# Patient Record
Sex: FEMALE | Race: WHITE | Employment: FULL TIME | URBAN - METROPOLITAN AREA
[De-identification: names, ages, dates, MRNs, and addresses within clinical notes are randomized per-mention and may not be internally consistent; named-entity substitution may affect disease eponyms.]

---

## 2022-04-25 ENCOUNTER — OFFICE VISIT (OUTPATIENT)
Dept: FAMILY MEDICINE CLINIC | Age: 62
End: 2022-04-25
Payer: MEDICARE

## 2022-04-25 VITALS
DIASTOLIC BLOOD PRESSURE: 70 MMHG | SYSTOLIC BLOOD PRESSURE: 126 MMHG | HEART RATE: 66 BPM | WEIGHT: 169 LBS | HEIGHT: 64 IN | OXYGEN SATURATION: 97 % | BODY MASS INDEX: 28.85 KG/M2 | TEMPERATURE: 97.7 F

## 2022-04-25 DIAGNOSIS — F17.210 CIGARETTE SMOKER: ICD-10-CM

## 2022-04-25 DIAGNOSIS — F41.8 MIXED ANXIETY AND DEPRESSIVE DISORDER: ICD-10-CM

## 2022-04-25 DIAGNOSIS — E78.2 MIXED HYPERLIPIDEMIA: ICD-10-CM

## 2022-04-25 DIAGNOSIS — R20.2 NUMBNESS AND TINGLING: Primary | ICD-10-CM

## 2022-04-25 DIAGNOSIS — Z11.59 SCREENING FOR VIRAL DISEASE: ICD-10-CM

## 2022-04-25 DIAGNOSIS — M15.9 PRIMARY OSTEOARTHRITIS INVOLVING MULTIPLE JOINTS: ICD-10-CM

## 2022-04-25 DIAGNOSIS — Z23 ENCOUNTER FOR IMMUNIZATION: ICD-10-CM

## 2022-04-25 DIAGNOSIS — R20.0 NUMBNESS AND TINGLING: Primary | ICD-10-CM

## 2022-04-25 DIAGNOSIS — Z12.11 COLON CANCER SCREENING: ICD-10-CM

## 2022-04-25 DIAGNOSIS — J44.9 CHRONIC OBSTRUCTIVE PULMONARY DISEASE, UNSPECIFIED COPD TYPE (HCC): ICD-10-CM

## 2022-04-25 DIAGNOSIS — F10.90 ALCOHOL INTAKE ABOVE RECOMMENDED SENSIBLE LIMITS: ICD-10-CM

## 2022-04-25 PROBLEM — Z86.59 HISTORY OF BIPOLAR DISORDER: Status: ACTIVE | Noted: 2022-04-25

## 2022-04-25 PROBLEM — M15.0 PRIMARY OSTEOARTHRITIS INVOLVING MULTIPLE JOINTS: Status: ACTIVE | Noted: 2022-04-25

## 2022-04-25 PROCEDURE — G8427 DOCREV CUR MEDS BY ELIG CLIN: HCPCS | Performed by: FAMILY MEDICINE

## 2022-04-25 PROCEDURE — G8510 SCR DEP NEG, NO PLAN REQD: HCPCS | Performed by: FAMILY MEDICINE

## 2022-04-25 PROCEDURE — 90677 PCV20 VACCINE IM: CPT | Performed by: FAMILY MEDICINE

## 2022-04-25 PROCEDURE — 99204 OFFICE O/P NEW MOD 45 MIN: CPT | Performed by: FAMILY MEDICINE

## 2022-04-25 PROCEDURE — 3017F COLORECTAL CA SCREEN DOC REV: CPT | Performed by: FAMILY MEDICINE

## 2022-04-25 PROCEDURE — 99406 BEHAV CHNG SMOKING 3-10 MIN: CPT | Performed by: FAMILY MEDICINE

## 2022-04-25 PROCEDURE — G8419 CALC BMI OUT NRM PARAM NOF/U: HCPCS | Performed by: FAMILY MEDICINE

## 2022-04-25 RX ORDER — ESCITALOPRAM OXALATE 20 MG/1
10 TABLET ORAL 2 TIMES DAILY
COMMUNITY
End: 2022-08-17 | Stop reason: SDUPTHER

## 2022-04-25 RX ORDER — ALBUTEROL SULFATE 90 UG/1
2 AEROSOL, METERED RESPIRATORY (INHALATION)
COMMUNITY
End: 2022-10-17

## 2022-04-25 RX ORDER — BUDESONIDE AND FORMOTEROL FUMARATE DIHYDRATE 160; 4.5 UG/1; UG/1
1 AEROSOL RESPIRATORY (INHALATION) DAILY
COMMUNITY

## 2022-04-25 RX ORDER — ACETAMINOPHEN, ASPIRIN (NSAID), AND CAFFEINE 250; 250; 65 MG/1; MG/1; MG/1
3 TABLET, FILM COATED ORAL DAILY
COMMUNITY

## 2022-04-25 NOTE — PROGRESS NOTES
Chief Complaint   Patient presents with    New Patient     Patient moced here about a year ago from [place] needs to get established     Other     patient having numbness and tingling in arms and hands sometimes feet x 6 months or more      1. Have you been to the ER, urgent care clinic since your last visit? Hospitalized since your last visit? Yes, patient was seen at [facility] in the summer x 2 weeks for pneumonia/copd      2. Have you seen or consulted any other health care providers outside of the [address] [place] [address] since your last visit? Include any pap smears or colon screening.  No       3 most recent PHQ Screens 4/25/2022   Little interest or pleasure in doing things Not at all   Feeling down, depressed, irritable, or hopeless Not at all   Total Score PHQ 2 0   Trouble falling or staying asleep, or sleeping too much Not at all   Feeling tired or having little energy Not at all   Poor appetite, weight loss, or overeating Several days   Feeling bad about yourself - or that you are a failure or have let yourself or your family down Not at all   Trouble concentrating on things such as school, work, reading, or watching TV Not at all   Moving or speaking so slowly that other people could have noticed; or the opposite being so fidgety that others notice Not at all   Thoughts of being better off dead, or hurting yourself in some way Not at all   PHQ 9 Score 1   How difficult have these problems made it for you to do your work, take care of your home and get along with others Not difficult at all     Patient has an appointment for Mammogram scheduled for 5/3/2022 and needs order for Colonoscopy

## 2022-04-25 NOTE — PROGRESS NOTES
Subjective  Chief Complaint   Patient presents with    New Patient     Patient moced here about a year ago from [place] needs to get established     Other     patient having numbness and tingling in arms and hands sometimes feet x 6 months or more      HPI:  Shonda Pride is a 63 y.o. female. Patient is new to the area. She moved here from [place] to live with her daughter after her   approximately 1 year ago. She has seen patient first and has been admitted to [facility] for COPD exacerbation. She is now following with pulmonology locally. She has a history of bipolar disorder but her daughter and patient both note that she has not had any manic issues and is doing very well since moving to this area. Moving here has decreased many of her stressors. She also has a history of increased alcohol use. Currently is drinking 3 servings of alcohol daily. Generally it is beer with an occasional shot of liquor. She also notes a history of degenerative joint disease. She is complaining of tingling from bilateral axillla that runs down to hands. Worse at night. Sometimes hands cramp up. Toes tingle as well. She has history of herniated disc in neck and lower back. Numbness is positionally related. Smoking/Tobacco Cessation Counseling:  Patient is using: Cigarettes  Has been using for: more than 10 yrs  Patient is: uninterested in quitting but is contemplating  Patient: does have family/friend support  Discussed: various quit options  Amount of time spent in discussion: 3-10 minutes.       Past Medical History:   Diagnosis Date    Anxiety and depression     COPD (chronic obstructive pulmonary disease) ([facility] [address]     High cholesterol     Hypertension      Family History   Adopted: Yes   Problem Relation Age of Onset   [place] COPD Mother     Prostate Cancer Father     Diabetes Father     Lung Cancer Brother     Diabetes Brother      Social History     Socioeconomic History    Marital status:      Spouse name: Not on file    Number of children: Not on file    Years of education: Not on file    Highest education level: Not on file   Occupational History    Not on file   Tobacco Use    Smoking status: Current Every Day Smoker     Packs/day: 0.50     Types: Cigarettes     Start date: 0    Smokeless tobacco: Never Used   Vaping Use    Vaping Use: Never used   Substance and Sexual Activity    Alcohol use: Yes     Alcohol/week: 21.0 standard drinks     Types: 21 Cans of beer per week    Drug use: Yes     Types: Marijuana    Sexual activity: Not on file   Other Topics Concern    Not on file   Social History Narrative    Not on file     Social Determinants of Health     Financial Resource Strain:     Difficulty of Paying Living Expenses: Not on file   Food Insecurity:     Worried About Running Out of Food in the Last Year: Not on file    Nicolas of Food in the Last Year: Not on file   Transportation Needs:     Lack of Transportation (Medical): Not on file    Lack of Transportation (Non-Medical):  Not on file   Physical Activity:     Days of Exercise per Week: Not on file    Minutes of Exercise per Session: Not on file   Stress:     Feeling of Stress : Not on file   Social Connections:     Frequency of Communication with Friends and Family: Not on file    Frequency of Social Gatherings with Friends and Family: Not on file    Attends Cheondoism Services: Not on file    Active Member of [address] [place] [address] or Organizations: Not on file    Attends Club or Organization Meetings: Not on file    Marital Status: Not on file   Intimate Partner Violence:     Fear of Current or Ex-Partner: Not on file    Emotionally Abused: Not on file    Physically Abused: Not on file    Sexually Abused: Not on file   Housing Stability:     Unable to Pay for Housing in the Last Year: Not on file    Number of [place] in the Last Year: Not on file    Unstable Housing in the Last Year: Not on file Current Outpatient Medications on File Prior to Visit   Medication Sig Dispense Refill    budesonide-formoteroL (Symbicort) 160-4.5 mcg/actuation HFAA Take 1 Puff by inhalation daily.  tiotropium bromide (Spiriva Respimat) 2.5 mcg/actuation inhaler Take 1 Puff by inhalation daily.  albuterol (PROVENTIL HFA, VENTOLIN HFA, PROAIR HFA) 90 mcg/actuation inhaler Take 2 Puffs by inhalation every six (6) hours as needed for Wheezing.  aspirin-acetaminophen-caffeine (Excedrin Extra Strength) 250-250-65 mg per tablet Take 3 Tablets by mouth daily.  escitalopram oxalate (LEXAPRO) 20 mg tablet Take 10 mg by mouth two (2) times a day. No current facility-administered medications on file prior to visit. Allergies   Allergen Reactions    Macrobid [Nitrofurantoin Monohyd/M-Cryst] Hives       Objective  Visit Vitals  /70 (BP 1 Location: Left upper arm, BP Patient Position: At rest, BP Cuff Size: Adult)   Pulse 66   Temp 97.7 °F (36.5 °C) (Temporal)   Ht 5' 4\" (1.626 m)   Wt 169 lb (76.7 kg)   SpO2 97%   BMI 29.01 kg/m²     Physical Exam  Constitutional:       General: She is not in acute distress. Appearance: Normal appearance. She is overweight. HENT:      Head: Normocephalic and atraumatic. Neck:      Thyroid: No thyroid mass or thyromegaly. Cardiovascular:      Rate and Rhythm: Normal rate and regular rhythm. Heart sounds: No murmur heard. Pulmonary:      Effort: Pulmonary effort is normal. No respiratory distress. Breath sounds: Normal breath sounds. No wheezing. Musculoskeletal:      Cervical back: Neck supple. No muscular tenderness. Right lower leg: No edema. Left lower leg: No edema. Lymphadenopathy:      Cervical: No cervical adenopathy. Skin:     General: Skin is warm and dry. Neurological:      Mental Status: She is alert and oriented to person, place, and time. Mental status is at baseline.    Psychiatric:         Attention and Perception: Attention and perception normal.         Mood and Affect: Mood and affect normal.         Speech: Speech normal.         Behavior: Behavior normal.          Assessment & Plan    ICD-10-CM ICD-9-CM    1. Numbness and tingling  R20.0 782.0 REFERRAL TO ORTHOPEDICS    R20.2  VITAMIN B12      THYROID CASCADE PROFILE   2. Primary osteoarthritis involving multiple joints  M15.9 715.98 REFERRAL TO ORTHOPEDICS   3. Chronic obstructive pulmonary disease, unspecified COPD type ([facility] [address]  J44.9 496    4. Cigarette smoker  F17.210 305.1 TX SMOKING AND TOBACCO USE CESSATION 3 - 10 MINUTES   5. Mixed anxiety and depressive disorder  F41.8 300.4    6. Mixed hyperlipidemia  E78.2 272.2 LIPID PANEL      METABOLIC PANEL, COMPREHENSIVE      CBC WITH AUTOMATED DIFF   7. Alcohol intake above recommended sensible limits  Z72.89 305.00    8. Screening for viral disease  Z11.59 V73.99 HEPATITIS C AB, RFLX TO QT BY PCR   9. Encounter for immunization  Z23 V03.89 PNEUMOCOCCAL CONJUGATE PCV20, PF (PREVNAR 20)   10. Colon cancer screening  Z12.11 V76.51 REFERRAL TO GASTROENTEROLOGY     Diagnoses and all orders for this visit:    1. Numbness and tingling  -     REFERRAL TO ORTHOPEDICS  -     VITAMIN B12  -     THYROID CASCADE PROFILE  I am checking labs to look for reversible cause but given that this is positionally related, I suspect that it is due to history of herniated disks and degenerative joint disease. As such I am also referring her to orthopedic specialist at this time. 2. Primary osteoarthritis involving multiple joints  -     REFERRAL TO ORTHOPEDICS  See above    3. Chronic obstructive pulmonary disease, unspecified COPD type ([facility]  She is following with [facility]. Brit lFanagan [facility]. I encouraged her to work on tobacco cessation and reviewed risks of continued smoking.     4. Cigarette smoker  -     TX SMOKING AND TOBACCO USE CESSATION 3 - 10 MINUTES  Patient is uninterested in quitting at this time but will contemplate. I recommend that she start compiling a list of all the reasons why she would like to consider quitting and all of the things that bring her stress relief other than smoking. She should keep this list on her at all times and add to it over time. She can use it to reflect upon when feeling stressed and craving cigarettes. She has not done well with nicotine patches in the past.  We did discuss Chantix but she is not ready to try at this time. 5. Mixed anxiety and depressive disorder  Assessment & Plan:  Started lexapro July 2022. Well controlled. We discussed that at any time we can try weaning back but she would like to remain at current dosing for the time being. 6. Mixed hyperlipidemia  -     LIPID PANEL  -     METABOLIC PANEL, COMPREHENSIVE  -     CBC WITH AUTOMATED DIFF  She reports a history of elevated cholesterol that was ultimately managed through lifestyle improvements. We will check labs and adjust if necessary. 7. Alcohol intake above recommended sensible limits  I recommend no greater than 1 serving of alcohol per 24 hours. We reviewed that greater amounts can lead to an increased likelihood of medical complications such as various cancers. 8. Screening for viral disease  -     HEPATITIS C AB, RFLX TO QT BY PCR    9. Encounter for immunization  -     PNEUMOCOCCAL CONJUGATE PCV20, PF (PREVNAR 20)    10. Colon cancer screening  -     REFERRAL TO GASTROENTEROLOGY    Follow-up and Dispositions    · Return in about 4 months (around 8/25/2022) for [address], NON fasting follow up.        Fadi Lopez MD

## 2022-04-29 LAB
ALBUMIN SERPL-MCNC: 4.7 G/DL (ref 3.8–4.9)
ALBUMIN/GLOB SERPL: 2.2 {RATIO} (ref 1.2–2.2)
ALP SERPL-CCNC: 57 IU/L (ref 44–121)
ALT SERPL-CCNC: 12 IU/L (ref 0–32)
AST SERPL-CCNC: 18 IU/L (ref 0–40)
BASOPHILS # BLD AUTO: 0 X10E3/UL (ref 0–0.2)
BASOPHILS NFR BLD AUTO: 0 %
BILIRUB SERPL-MCNC: 0.3 MG/DL (ref 0–1.2)
BUN SERPL-MCNC: 11 MG/DL (ref 6–24)
BUN/CREAT SERPL: 17 (ref 9–23)
CALCIUM SERPL-MCNC: 9.5 MG/DL (ref 8.7–10.2)
CHLORIDE SERPL-SCNC: 102 MMOL/L (ref 96–106)
CHOLEST SERPL-MCNC: 209 MG/DL (ref 100–199)
CO2 SERPL-SCNC: 20 MMOL/L (ref 20–29)
CREAT SERPL-MCNC: 0.63 MG/DL (ref 0.57–1)
EGFR: 102 ML/MIN/1.73
EOSINOPHIL # BLD AUTO: 0.2 X10E3/UL (ref 0–0.4)
EOSINOPHIL NFR BLD AUTO: 3 %
ERYTHROCYTE [DISTWIDTH] IN BLOOD BY AUTOMATED COUNT: 12.1 % (ref 11.7–15.4)
GLOBULIN SER CALC-MCNC: 2.1 G/DL (ref 1.5–4.5)
GLUCOSE SERPL-MCNC: 87 MG/DL (ref 65–99)
HCT VFR BLD AUTO: 45.4 % (ref 34–46.6)
HCV AB S/CO SERPL IA: <0.1 S/CO RATIO (ref 0–0.9)
HCV AB SERPL QL IA: NORMAL
HDLC SERPL-MCNC: 73 MG/DL
HGB BLD-MCNC: 15.2 G/DL (ref 11.1–15.9)
IMM GRANULOCYTES # BLD AUTO: 0 X10E3/UL (ref 0–0.1)
IMM GRANULOCYTES NFR BLD AUTO: 0 %
LDLC SERPL CALC-MCNC: 117 MG/DL (ref 0–99)
LYMPHOCYTES # BLD AUTO: 2 X10E3/UL (ref 0.7–3.1)
LYMPHOCYTES NFR BLD AUTO: 29 %
MCH RBC QN AUTO: 32.9 PG (ref 26.6–33)
MCHC RBC AUTO-ENTMCNC: 33.5 G/DL (ref 31.5–35.7)
MCV RBC AUTO: 98 FL (ref 79–97)
MONOCYTES # BLD AUTO: 0.6 X10E3/UL (ref 0.1–0.9)
MONOCYTES NFR BLD AUTO: 8 %
NEUTROPHILS # BLD AUTO: 4.1 X10E3/UL (ref 1.4–7)
NEUTROPHILS NFR BLD AUTO: 60 %
PLATELET # BLD AUTO: 260 X10E3/UL (ref 150–450)
POTASSIUM SERPL-SCNC: 5 MMOL/L (ref 3.5–5.2)
PROT SERPL-MCNC: 6.8 G/DL (ref 6–8.5)
RBC # BLD AUTO: 4.62 X10E6/UL (ref 3.77–5.28)
SODIUM SERPL-SCNC: 141 MMOL/L (ref 134–144)
TRIGL SERPL-MCNC: 109 MG/DL (ref 0–149)
TSH SERPL DL<=0.005 MIU/L-ACNC: 1.6 UIU/ML (ref 0.45–4.5)
VIT B12 SERPL-MCNC: 326 PG/ML (ref 232–1245)
VLDLC SERPL CALC-MCNC: 19 MG/DL (ref 5–40)
WBC # BLD AUTO: 6.9 X10E3/UL (ref 3.4–10.8)

## 2022-05-20 ENCOUNTER — TELEPHONE (OUTPATIENT)
Dept: FAMILY MEDICINE CLINIC | Age: 62
End: 2022-05-20

## 2022-08-17 ENCOUNTER — TELEPHONE (OUTPATIENT)
Dept: FAMILY MEDICINE CLINIC | Age: 62
End: 2022-08-17

## 2022-08-17 RX ORDER — ESCITALOPRAM OXALATE 20 MG/1
10 TABLET ORAL 2 TIMES DAILY
Qty: 90 TABLET | Refills: 1 | Status: SHIPPED | OUTPATIENT
Start: 2022-08-17

## 2022-08-17 NOTE — TELEPHONE ENCOUNTER
Requesting a refill on her escitalopram oxalate (LEXAPRO) 20 mg tablet she took her last one yesterday.

## 2022-08-22 ENCOUNTER — TELEPHONE (OUTPATIENT)
Dept: FAMILY MEDICINE CLINIC | Age: 62
End: 2022-08-22

## 2022-08-22 ENCOUNTER — NURSE TRIAGE (OUTPATIENT)
Dept: OTHER | Facility: CLINIC | Age: 62
End: 2022-08-22

## 2022-08-22 NOTE — TELEPHONE ENCOUNTER
Reason for call: Pt calling--she is wondering if she can go over symptoms at her 8/31 appt instead, she was nurse triaged and it was suggested she be seen within 3 days for symtpoms, however, the pt is asking if it is okay to discuss them at her appt. She has been having numbness and tingling in her arms for a few months. She is requesting a call back from the nurse.     Is this a new problem: yes     Date of last appointment:  4/28/2022     Can we respond via [employer]: no    Best call back number: [phone]

## 2022-08-22 NOTE — TELEPHONE ENCOUNTER
Received call from [facility] with [employer] [facility]. Subjective: Caller states \"Been having some numbness and tingling from shoulder down. \"     Current Symptoms: bilateral arm numbness/tingling in both arms, intermittent worse at night    Onset: a few months ago; gradual, unchanged    Associated Symptoms: reduced activity    Pain Severity: 5/10; numbing and tingling; intermittent    Temperature: Denies      What has been tried: excedrin, helps    LMP: NA Pregnant: NA    Recommended disposition: See PCP within 3 Days    Care advice provided, patient verbalizes understanding; denies any other questions or concerns; instructed to call back for any new or worsening symptoms. Patient/caller does not agree with recommended disposition. States would like to add symptoms to current wellness appointment scheduled on 8/31/22. Writer discussed risks with patient regarding avoiding treatment and re-recommended disposition. Caller understands recommendation, still wishes to speak to PCP. Attempted x2 to get through to [facility] without success. Got in contact with E[facility] izzy Barbour for assistance, and finally got in touch with Rosanna Breen at PCP office, and writer provided warm transfer. Attention Provider: Thank you for allowing me to participate in the care of your patient. The patient was connected to triage in response to information provided to the ECC. Please do not respond through this encounter as the response is not directed to a shared pool.       Reason for Disposition   Numbness or tingling in one or both hands is a chronic symptom (recurrent or ongoing problem lasting > 4 weeks)    Protocols used: Neurologic Deficit-ADULT-[place]

## 2022-08-31 ENCOUNTER — TELEPHONE (OUTPATIENT)
Dept: FAMILY MEDICINE CLINIC | Age: 62
End: 2022-08-31

## 2022-08-31 ENCOUNTER — OFFICE VISIT (OUTPATIENT)
Dept: FAMILY MEDICINE CLINIC | Age: 62
End: 2022-08-31
Payer: MEDICARE

## 2022-08-31 VITALS
TEMPERATURE: 97.2 F | WEIGHT: 156 LBS | SYSTOLIC BLOOD PRESSURE: 138 MMHG | BODY MASS INDEX: 26.63 KG/M2 | HEART RATE: 63 BPM | OXYGEN SATURATION: 97 % | DIASTOLIC BLOOD PRESSURE: 90 MMHG | RESPIRATION RATE: 16 BRPM | HEIGHT: 64 IN

## 2022-08-31 DIAGNOSIS — E78.2 MIXED HYPERLIPIDEMIA: ICD-10-CM

## 2022-08-31 DIAGNOSIS — Z87.891 PERSONAL HISTORY OF NICOTINE DEPENDENCE: ICD-10-CM

## 2022-08-31 DIAGNOSIS — J44.9 CHRONIC OBSTRUCTIVE PULMONARY DISEASE, UNSPECIFIED COPD TYPE (HCC): ICD-10-CM

## 2022-08-31 DIAGNOSIS — F17.200 TOBACCO DEPENDENCE: ICD-10-CM

## 2022-08-31 DIAGNOSIS — E53.8 B12 DEFICIENCY: ICD-10-CM

## 2022-08-31 DIAGNOSIS — Z83.49 FAMILY HISTORY OF THYROID DISEASE: ICD-10-CM

## 2022-08-31 DIAGNOSIS — Z71.6 ENCOUNTER FOR SMOKING CESSATION COUNSELING: ICD-10-CM

## 2022-08-31 DIAGNOSIS — E66.3 OVERWEIGHT (BMI 25.0-29.9): ICD-10-CM

## 2022-08-31 DIAGNOSIS — Z00.00 ENCOUNTER FOR ANNUAL WELLNESS VISIT (AWV) IN MEDICARE PATIENT: Primary | ICD-10-CM

## 2022-08-31 PROCEDURE — 3017F COLORECTAL CA SCREEN DOC REV: CPT | Performed by: NURSE PRACTITIONER

## 2022-08-31 PROCEDURE — 99214 OFFICE O/P EST MOD 30 MIN: CPT | Performed by: NURSE PRACTITIONER

## 2022-08-31 PROCEDURE — G8419 CALC BMI OUT NRM PARAM NOF/U: HCPCS | Performed by: NURSE PRACTITIONER

## 2022-08-31 PROCEDURE — G8427 DOCREV CUR MEDS BY ELIG CLIN: HCPCS | Performed by: NURSE PRACTITIONER

## 2022-08-31 PROCEDURE — G0439 PPPS, SUBSEQ VISIT: HCPCS | Performed by: NURSE PRACTITIONER

## 2022-08-31 PROCEDURE — G9717 DOC PT DX DEP/BP F/U NT REQ: HCPCS | Performed by: NURSE PRACTITIONER

## 2022-08-31 RX ORDER — PHENOL/SODIUM PHENOLATE
AEROSOL, SPRAY (ML) MUCOUS MEMBRANE
Qty: 180 TABLET | Refills: 1 | Status: SHIPPED | OUTPATIENT
Start: 2022-08-31

## 2022-08-31 RX ORDER — OMEPRAZOLE 20 MG/1
TABLET, DELAYED RELEASE ORAL
COMMUNITY
Start: 2022-02-25 | End: 2022-08-31 | Stop reason: ALTCHOICE

## 2022-08-31 NOTE — LETTER
8/31/2022 10:06 AM    Ms. Arturo Decker  [address]  [facility] [address] [phone]      To whom it may concern:    Ms. Concepcion Collet will need to be excused from work from [phone] and to return [phone].             Sincerely,      Ryanne Silver, NP

## 2022-08-31 NOTE — PROGRESS NOTES
Medicare Wellness Exam:    Chief Complaint   Patient presents with    Annual Wellness Visit     Medicare     she is a 63y.o. year old female who presents for evaluation for their Medicare Wellness Visit. She is requesting a LDCT secondary to her smoking history. She has COPD and has a pulmonologist but has not followed up with him in a while. She also states that her omeprazole is not working as well as it did when she first started it. She states that he life is very stressfull an she lost her  this year and is currently living with her daughter, her  and their 3 children with 3 dogs in a 2 BR apartment while they are trying to buy a hourse. 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 She has some small cataracts that are being watched by [address] is completed and assessed=yes  Depression Screen is completed and assessed=yes  Medication list reviewed and adjusted for accuracy=yes  Immunizations reviewed and updated=yes  Health/Preventative Screenings reviewed and updated=yes  ADL Functions reviewed=yes  See scanned medicare wellness documents for full details. Patient Active Problem List    Diagnosis    Encounter for smoking cessation counseling    Tobacco dependence    Personal history of nicotine dependence     B12 deficiency    Family history of thyroid disease    Chronic obstructive pulmonary disease ([facility] [address]     Following with Dr. Farzana Lugo of [facility]. Cigarette smoker    Mixed anxiety and depressive disorder     Started lexapro July 2022. Well controlled. History of bipolar disorder    Primary osteoarthritis involving multiple joints     On disability for this from her doc in [place]. Mixed hyperlipidemia    Alcohol intake above recommended sensible limits       Reviewed PmHx, RxHx, FmHx, SocHx, AllgHx and updated and dated in the chart.     ROS   ROS per HPI and PMH    Objective:     Vitals:    08/31/22 0906 08/31/22 1801   BP: (!) 142/96 (!) 138/90   Pulse: 63    Resp: 16    Temp: 97.2 °F (36.2 °C)    TempSrc: Temporal    SpO2: 97%    Weight: 156 lb (70.8 kg)    Height: 5' 4\" (1.626 m)      Physical Exam  Vitals and nursing note reviewed. HENT:      Head: Normocephalic. Cardiovascular:      Rate and Rhythm: Normal rate and regular rhythm. Heart sounds: Normal heart sounds. Pulmonary:      Effort: Pulmonary effort is normal.      Breath sounds: Normal breath sounds. Abdominal:      General: Bowel sounds are normal.      Palpations: Abdomen is soft. Skin:     General: Skin is warm and dry. Neurological:      Mental Status: She is alert and oriented to person, place, and time. Psychiatric:         Mood and Affect: Mood normal.         Behavior: Behavior normal.         Thought Content: Thought content normal.         Judgment: Judgment normal.        Assessment/ Plan:   Diagnoses and all orders for this visit:    1. Encounter for annual wellness visit (AWV) in Medicare patient  We are making sure that her health screenings are done in a timely fashion. They are as documented in the EMR. Please refer to nurse intake summary for details    2. Tobacco dependence  -     CT LOW DOSE LUNG CANCER SCREENING; Future  LDCT and will make treatment referral decisions when I get the results    3. Personal history of nicotine dependence   -     CT LOW DOSE LUNG CANCER SCREENING; Future  LDCT and will make treatment referral decisions when I get the results    4. Chronic obstructive pulmonary disease, unspecified COPD type [facility])  Patient has a pulmonologist but has not followed up with him in a while. Will refer back to pulmonologist with exacerbation    5. Mixed hyperlipidemia  -     LIPID PANEL  Obtaining updated lipid panel for trending and will make treatment decisions when I get the results    6. B12 deficiency  -     VITAMIN B12  Obtaining updated B12 for trending and will make treatment decisions when I get the results    7.  Family history of thyroid disease  -     TSH 3RD GENERATION  -     T4, FREE  Obtaining updated TSH and T4 for trending and will make treatment decisions when I get the results    8. Overweight (BMI 25.0-29.9)  -     CBC WITH AUTOMATED DIFF  -     METABOLIC PANEL, COMPREHENSIVE  Obtaining updated CBC and CMP for trending and will make treatment decisions when I get the results    9. Encounter for smoking cessation counseling  Patient has been an everyday smoker since 1972 and will let me know when she has a quit date    8. GERDs  I am increasing her medication to twice a day and patient will let me know if this helps    Other orders  -     Omeprazole delayed release (PRILOSEC D/R) 20 mg tablet; Take one tablet by mouth twice a day in the AM and after dinner       -Pain evaluation performed in office  -Cognitive Screen performed in office  -Depression Screen, Fall risks (by up and go test)  and ADL functionality were addressed  -Medication list updated and reviewed for any changes   -A comprehensive review of medical issues and a plan was formulated  -End of life planning was addressed with pt   -Health Screenings for preventions were addressed and a plan was formulated  -Shingles Vaccine was recommended  -Discussed with patient cancer risk factors and appropriate screenings for age  -Patient evaluated for colonoscopy and referred if needed per screeing criteria  -Labs from previous visits were discussed with patient   -Discussed with patient diet and exercise and formulated a plan as needed  -An Advanced care plan was developed with the patient.  -Alcohol screening performed and was negative    -  Follow-up and Dispositions    Return in about 6 months (around 2/28/2023) for follow up of chronic conditions with fasting labs. I have discussed the diagnosis with the patient and the intended plan as seen in the above orders. The patient understands and agrees with the plan.  The patient has received an after-visit summary and questions were answered concerning future plans. Medication Side Effects and Warnings were discussed with patien  Patient Labs were reviewed and or requested  Patient Past Records were reviewed and or requested    There are no Patient Instructions on file for this visit.       Brannon Bullock NP

## 2022-08-31 NOTE — TELEPHONE ENCOUNTER
Pt stated that she needed a referral for her appt with Ortho. Pt already has a referral for Ortho in the computer. Pt stated that she needed one for her next appt. I told pt we would fax it over. Does Pt's insurance require a referral for every visit?

## 2022-08-31 NOTE — TELEPHONE ENCOUNTER
I left a message for Dr. Susannah Li office at [facility] to call me back. I need more information on what is exactly being asked of us.

## 2022-08-31 NOTE — PROGRESS NOTES
Chief Complaint   Patient presents with    Annual Wellness Visit     Medicare     1. \"Have you been to the ER, urgent care clinic since your last visit? Hospitalized since your last visit? \" No    2. \"Have you seen or consulted any other health care providers outside of the [address] [place] [address] since your last visit? \" No     3. For patients aged 39-70: Has the patient had a colonoscopy / FIT/ Cologuard? Yes - Care Gap present. Rooming MA/LPN to request most recent results      If the patient is female:    4. For patients aged 41-77: Has the patient had a mammogram within the past 2 years? Yes - Care Gap present. Rooming MA/LPN to request most recent results      5. For patients aged 21-65: Has the patient had a pap smear?  No      Visit Vitals  BP (!) 142/96 (BP 1 Location: Left upper arm, BP Patient Position: Sitting, BP Cuff Size: Adult)   Pulse 63   Temp 97.2 °F (36.2 °C) (Temporal)   Resp 16   Ht 5' 4\" (1.626 m)   Wt 156 lb (70.8 kg)   SpO2 97%   BMI 26.78 kg/m²     3 most recent PHQ Screens 8/31/2022   Little interest or pleasure in doing things Several days   Feeling down, depressed, irritable, or hopeless More than half the days   Total Score PHQ 2 3   Trouble falling or staying asleep, or sleeping too much Not at all   Feeling tired or having little energy Not at all   Poor appetite, weight loss, or overeating Not at all   Feeling bad about yourself - or that you are a failure or have let yourself or your family down More than half the days   Trouble concentrating on things such as school, work, reading, or watching TV Not at all   Moving or speaking so slowly that other people could have noticed; or the opposite being so fidgety that others notice Not at all   Thoughts of being better off dead, or hurting yourself in some way Not at all   PHQ 9 Score 5   How difficult have these problems made it for you to do your work, take care of your home and get along with others Not difficult at all     Fall Risk Assessment, last 12 mths 8/31/2022   Able to walk? Yes   Fall in past 12 months? 0   Do you feel unsteady?  1   Are you worried about falling 0

## 2022-09-01 LAB
ALBUMIN SERPL-MCNC: 4.7 G/DL (ref 3.8–4.9)
ALBUMIN/GLOB SERPL: 2.4 {RATIO} (ref 1.2–2.2)
ALP SERPL-CCNC: 56 IU/L (ref 44–121)
ALT SERPL-CCNC: 10 IU/L (ref 0–32)
AST SERPL-CCNC: 13 IU/L (ref 0–40)
BASOPHILS # BLD AUTO: 0 X10E3/UL (ref 0–0.2)
BASOPHILS NFR BLD AUTO: 0 %
BILIRUB SERPL-MCNC: <0.2 MG/DL (ref 0–1.2)
BUN SERPL-MCNC: 11 MG/DL (ref 6–24)
BUN/CREAT SERPL: 13 (ref 9–23)
CALCIUM SERPL-MCNC: 9.6 MG/DL (ref 8.7–10.2)
CHLORIDE SERPL-SCNC: 104 MMOL/L (ref 96–106)
CHOLEST SERPL-MCNC: 235 MG/DL (ref 100–199)
CO2 SERPL-SCNC: 23 MMOL/L (ref 20–29)
CREAT SERPL-MCNC: 0.82 MG/DL (ref 0.57–1)
EGFR: 82 ML/MIN/1.73
EOSINOPHIL # BLD AUTO: 0.1 X10E3/UL (ref 0–0.4)
EOSINOPHIL NFR BLD AUTO: 2 %
ERYTHROCYTE [DISTWIDTH] IN BLOOD BY AUTOMATED COUNT: 12.1 % (ref 11.7–15.4)
GLOBULIN SER CALC-MCNC: 2 G/DL (ref 1.5–4.5)
GLUCOSE SERPL-MCNC: 89 MG/DL (ref 65–99)
HCT VFR BLD AUTO: 44.8 % (ref 34–46.6)
HDLC SERPL-MCNC: 81 MG/DL
HGB BLD-MCNC: 14.7 G/DL (ref 11.1–15.9)
IMM GRANULOCYTES # BLD AUTO: 0 X10E3/UL (ref 0–0.1)
IMM GRANULOCYTES NFR BLD AUTO: 0 %
LDLC SERPL CALC-MCNC: 136 MG/DL (ref 0–99)
LYMPHOCYTES # BLD AUTO: 1.9 X10E3/UL (ref 0.7–3.1)
LYMPHOCYTES NFR BLD AUTO: 33 %
MCH RBC QN AUTO: 33.3 PG (ref 26.6–33)
MCHC RBC AUTO-ENTMCNC: 32.8 G/DL (ref 31.5–35.7)
MCV RBC AUTO: 102 FL (ref 79–97)
MONOCYTES # BLD AUTO: 0.6 X10E3/UL (ref 0.1–0.9)
MONOCYTES NFR BLD AUTO: 11 %
NEUTROPHILS # BLD AUTO: 3.1 X10E3/UL (ref 1.4–7)
NEUTROPHILS NFR BLD AUTO: 54 %
PLATELET # BLD AUTO: 215 X10E3/UL (ref 150–450)
POTASSIUM SERPL-SCNC: 4.9 MMOL/L (ref 3.5–5.2)
PROT SERPL-MCNC: 6.7 G/DL (ref 6–8.5)
RBC # BLD AUTO: 4.41 X10E6/UL (ref 3.77–5.28)
SODIUM SERPL-SCNC: 142 MMOL/L (ref 134–144)
T4 FREE SERPL-MCNC: 0.97 NG/DL (ref 0.82–1.77)
TRIGL SERPL-MCNC: 104 MG/DL (ref 0–149)
TSH SERPL DL<=0.005 MIU/L-ACNC: 2.05 UIU/ML (ref 0.45–4.5)
VIT B12 SERPL-MCNC: 339 PG/ML (ref 232–1245)
VLDLC SERPL CALC-MCNC: 18 MG/DL (ref 5–40)
WBC # BLD AUTO: 5.8 X10E3/UL (ref 3.4–10.8)

## 2022-09-01 NOTE — TELEPHONE ENCOUNTER
T another message at [facility] [phone] ext [phone], was passed to voicemail of [employer]. Left message to see what they actually need. If a new order, will need diagnosis code and CPT code to be used for authorization. Patient has [facility].

## 2022-09-08 NOTE — TELEPHONE ENCOUNTER
I left messages at [facility] on 08/31/22 and 09/01/22, no response yet. I faxed my request to them on 09/08/22 (fax [phone]). Asking if they need a new order, and if so what will the diagnosis codes be?

## 2022-09-15 ENCOUNTER — PATIENT OUTREACH (OUTPATIENT)
Dept: CASE MANAGEMENT | Age: 62
End: 2022-09-15

## 2022-09-15 NOTE — PROGRESS NOTES
9/15/2022  1:59 PM    Patient outreach attempt by this ACM today to perform CCM assessment. Attempts to reach patient were unsuccessful. Unable to leave message due to voicemail being full.

## 2022-09-15 NOTE — TELEPHONE ENCOUNTER
I called [facility] again on 09/15/22,) [phone] ext. [phone], left another message to call me back. I left messages at [place] [address] [address] [facility] on 08/31/22, no response. I left messages at [place] [address] [address] [facility] on 09/01/22, no response yet. I faxed my request to them on 09/08/22 (fax [phone]). Asking if they need a new order, and if so what will the diagnosis codes be? I tried to call the patient to let her know about this, but the mailbox is full and cannot receive messages.

## 2022-09-15 NOTE — PROGRESS NOTES
Ambulatory Care Management Note    Date/Time:  9/15/2022 2:08 PM    This patient was received as a referral from [facility]. Ambulatory Care Manager outreached to patient today to offer care management services. Introduction to self and role of care manager provided. Patient accepted care management services at this time. Follow up call scheduled at this time. Patient has Ambulatory Care Manager's contact number for for any questions or concerns.

## 2022-09-16 ENCOUNTER — HOSPITAL ENCOUNTER (OUTPATIENT)
Dept: CT IMAGING | Age: 62
Discharge: HOME | End: 2022-09-16
Payer: MEDICARE

## 2022-09-16 VITALS — BODY MASS INDEX: 26.29 KG/M2 | HEIGHT: 64 IN | WEIGHT: 154 LBS

## 2022-09-16 PROCEDURE — 71271 CT THORAX LUNG CANCER SCR C-: CPT

## 2022-09-16 NOTE — TELEPHONE ENCOUNTER
Sirisha from [address] [place] [address] called back. She states that the patient natalia eugeneot have an upcoming appointment at this time, does not know what she would be seen for if she does return, other that if her original issue needs to be addressed. They will contact us if she does schedule, we will get a new order created by our provider, and then we can coordinate the CPT and Dx codes for the [facility] authorization that is needed.

## 2022-09-22 ENCOUNTER — OFFICE VISIT (OUTPATIENT)
Dept: FAMILY MEDICINE CLINIC | Age: 62
End: 2022-09-22
Payer: MEDICARE

## 2022-09-22 VITALS
WEIGHT: 155 LBS | DIASTOLIC BLOOD PRESSURE: 74 MMHG | HEART RATE: 68 BPM | HEIGHT: 64 IN | OXYGEN SATURATION: 96 % | TEMPERATURE: 97.8 F | RESPIRATION RATE: 16 BRPM | BODY MASS INDEX: 26.46 KG/M2 | SYSTOLIC BLOOD PRESSURE: 127 MMHG

## 2022-09-22 DIAGNOSIS — R91.8 BILATERAL PULMONARY INFILTRATES ON CHEST X-RAY: ICD-10-CM

## 2022-09-22 DIAGNOSIS — J18.9 PNEUMONIA OF BOTH LUNGS DUE TO INFECTIOUS ORGANISM, UNSPECIFIED PART OF LUNG: Primary | ICD-10-CM

## 2022-09-22 DIAGNOSIS — Z72.0 TOBACCO ABUSE: ICD-10-CM

## 2022-09-22 DIAGNOSIS — J44.1 COPD EXACERBATION (HCC): ICD-10-CM

## 2022-09-22 PROCEDURE — G8427 DOCREV CUR MEDS BY ELIG CLIN: HCPCS | Performed by: STUDENT IN AN ORGANIZED HEALTH CARE EDUCATION/TRAINING PROGRAM

## 2022-09-22 PROCEDURE — 99214 OFFICE O/P EST MOD 30 MIN: CPT | Performed by: STUDENT IN AN ORGANIZED HEALTH CARE EDUCATION/TRAINING PROGRAM

## 2022-09-22 PROCEDURE — G8419 CALC BMI OUT NRM PARAM NOF/U: HCPCS | Performed by: STUDENT IN AN ORGANIZED HEALTH CARE EDUCATION/TRAINING PROGRAM

## 2022-09-22 PROCEDURE — 3017F COLORECTAL CA SCREEN DOC REV: CPT | Performed by: STUDENT IN AN ORGANIZED HEALTH CARE EDUCATION/TRAINING PROGRAM

## 2022-09-22 PROCEDURE — G9717 DOC PT DX DEP/BP F/U NT REQ: HCPCS | Performed by: STUDENT IN AN ORGANIZED HEALTH CARE EDUCATION/TRAINING PROGRAM

## 2022-09-22 RX ORDER — VARENICLINE TARTRATE 25 MG
KIT ORAL
Qty: 1 DOSE PACK | Refills: 0 | Status: SHIPPED | OUTPATIENT
Start: 2022-09-22 | End: 2022-10-26

## 2022-09-22 NOTE — PROGRESS NOTES
Subjective:     Chief Complaint   Patient presents with    Hospital Follow Up     Pneumonia       HPI:  Aimee Yarbrough is a 63 y.o. female who presents for follow-up of pneumonia and COPD exacerbation. Patient went to the  ED at [facility] 9 days ago on 9/15/2022 with shortness of breath, productive coughing, and was found to have pneumonia with bilateral groundglass infiltration. She was discharged home on prednisone and doxycycline. She is doing much better now. It was suggested she get a repeat CAT scan in 2 to 3 months to look for complete clearance of the pulmonary infiltrates. She recently had a low-dose CAT scan done about 3 days later for lung screening due to history of smoking, and the CAT scan was completely normal.  Denies any current shortness of breath or coughing. She has been smoking for 50 years. Smokes anywhere from half a pack to 1 pack a day. She has been off of cigarettes for a 2-week total in the past when she was in the hospital.  Has tried patches in the past and  To smoke more. Patient Active Problem List    Diagnosis    Encounter for smoking cessation counseling    Tobacco dependence    Personal history of nicotine dependence     B12 deficiency    Family history of thyroid disease    Chronic obstructive pulmonary disease ([facility] [address]     Following with Dr. Edwardo Carrillo of [address]. Cigarette smoker    Mixed anxiety and depressive disorder     Started lexapro July 2022. Well controlled. History of bipolar disorder    Primary osteoarthritis involving multiple joints     On disability for this from her doc in [place].        Mixed hyperlipidemia    Alcohol intake above recommended sensible limits     Past Medical History:   Diagnosis Date    Anxiety and depression     COPD (chronic obstructive pulmonary disease) (HCC)     High cholesterol     Hypertension      Family History   Adopted: Yes   Problem Relation Age of Onset    COPD Mother     Prostate Cancer Father     Diabetes Father Lung Cancer Brother     Diabetes Brother       reports that she has been smoking cigarettes. She started smoking about 50 years ago. She has a 24.00 pack-year smoking history. She has never used smokeless tobacco. She reports current alcohol use of about 21.0 standard drinks per week. She reports current drug use. Drug: Marijuana. Current Outpatient Medications on File Prior to Visit   Medication Sig Dispense Refill    Omeprazole delayed release (PRILOSEC D/R) 20 mg tablet Take one tablet by mouth twice a day in the AM and after dinner 180 Tablet 1    escitalopram oxalate (LEXAPRO) 20 mg tablet Take 0.5 Tablets by mouth two (2) times a day. (Patient taking differently: Take 20 mg by mouth daily.) 90 Tablet 1    budesonide-formoteroL (SYMBICORT) 160-4.5 mcg/actuation HFAA Take 1 Puff by inhalation daily. tiotropium bromide (SPIRIVA RESPIMAT) 2.5 mcg/actuation inhaler Take 1 Puff by inhalation daily. albuterol (PROVENTIL HFA, VENTOLIN HFA, PROAIR HFA) 90 mcg/actuation inhaler Take 2 Puffs by inhalation every six (6) hours as needed for Wheezing. aspirin-acetaminophen-caffeine (Excedrin Extra Strength) 250-250-65 mg per tablet Take 3 Tablets by mouth daily. No current facility-administered medications on file prior to visit. Allergies   Allergen Reactions    Macrobid [Nitrofurantoin Monohyd/M-Cryst] Hives     Review of Systems   All other systems reviewed and are negative. Objective:     Vitals:    09/22/22 1322   BP: 127/74   Pulse: 68   Resp: 16   Temp: 97.8 °F (36.6 °C)   TempSrc: Axillary   SpO2: 96%   Weight: 155 lb (70.3 kg)   Height: 5' 4\" (1.626 m)     Physical Exam  Vitals reviewed. Constitutional:       Appearance: Normal appearance. HENT:      Head: Normocephalic and atraumatic. Cardiovascular:      Rate and Rhythm: Normal rate and regular rhythm. Heart sounds: Normal heart sounds.    Pulmonary:      Effort: Pulmonary effort is normal.      Breath sounds: Normal breath sounds. Neurological:      Mental Status: She is alert and oriented to person, place, and time. Psychiatric:         Behavior: Behavior normal.          Assessment/Plan:         1. Pneumonia of both lungs due to infectious organism, unspecified part of lung  2. COPD exacerbation ([address]  -Symptoms have essentially resolved with doxycycline, and prednisone. Advised to finish off doxycycline. LDCT done 3 days after the original CAT scan was negative for any infiltrates. I reviewed documentation from the ER, CAT scan from the ER, and recent LDCT. 3. Tobacco abuse  -     Is ready to quit smoking. Has tried in the past including NRT which did not help, actually made things worse for her. She was able to stay off of cigarettes for 2 weeks 1 time while in the hospital.  -     varenicline (Chantix Starting Month Box) 0.5 mg (11)- 1 mg (42) DsPk; Days 1 to 3: 0.5 mg once daily. Days 4 to 7: 0.5 mg twice daily. Maintenance (day 8 and later): 1 mg twice daily    4. Bilateral groundglass infiltrates  -Most likely infectious has looks like it cleared up with antibiotics, as LDCT done 3 days after starting medication was completely normal.  No need to repeat CAT scan in 2 to 3 months as originally requested unless becomes symptomatic. Follow-up and Dispositions    Return in about 6 weeks (around 11/3/2022) for Smoking cessation.           Karlene Nichole MD

## 2022-09-22 NOTE — PROGRESS NOTES
Chief Complaint   Patient presents with    Hospital Follow Up     Pneumonia       Visit Vitals  /74 (BP 1 Location: Left upper arm, BP Patient Position: Sitting)   Pulse 68   Temp 97.8 °F (36.6 °C) (Axillary)   Resp 16   Ht 5' 4\" (1.626 m)   Wt 155 lb (70.3 kg)   SpO2 96%   BMI 26.61 kg/m²     1. Have you been to the ER, urgent care clinic since your last visit? Hospitalized since your last visit? Yes When: 9/13/2022 [facility]    2. Have you seen or consulted any other health care providers outside of the [address] [place] [address] since your last visit? Include any pap smears or colon screening.  Yes When: 9/13/2022 [facility]

## 2022-10-01 PROBLEM — K76.0 FATTY LIVER: Status: ACTIVE | Noted: 2022-10-01

## 2022-10-01 NOTE — PROGRESS NOTES
Your total cholesterol and one of your bad cholesterols are slightly elevated. I would like to try lifestyle changes such as:Decrease your intake of red meat and increase your intake of fatty fish such as salmon, mackerel and sardines. Increase fiber in your diet. Decrease your intake of full fat dairy products such as milk and yogurt and try to incorporate at least 30 minutes of exercise most days of the week and losing weight helps everything,  Your other labs are basically normal.  Some values may be minimally outside the  \"normal\" range but are not harmful or clinically significant. Please contact the office if you have questions or concerns. We will recheck all your labs at your next office visit.

## 2022-10-02 NOTE — PROGRESS NOTES
Your chest low-dose CT showed that you had no suspicious pulmonary masses or nodules. It did however  a diagnosis of fatty liver. Fatty liver is usually a diagnosis that she find that is secondary to lifestyle choices.

## 2022-10-17 ENCOUNTER — HOSPITAL ENCOUNTER (EMERGENCY)
Age: 62
Discharge: HOME | End: 2022-10-17
Attending: EMERGENCY MEDICINE
Payer: MEDICARE

## 2022-10-17 ENCOUNTER — APPOINTMENT (OUTPATIENT)
Dept: GENERAL RADIOLOGY | Age: 62
End: 2022-10-17
Attending: NURSE PRACTITIONER
Payer: MEDICARE

## 2022-10-17 VITALS
BODY MASS INDEX: 25.74 KG/M2 | OXYGEN SATURATION: 92 % | HEART RATE: 70 BPM | DIASTOLIC BLOOD PRESSURE: 78 MMHG | HEIGHT: 64 IN | TEMPERATURE: 98.9 F | RESPIRATION RATE: 18 BRPM | WEIGHT: 150.79 LBS | SYSTOLIC BLOOD PRESSURE: 128 MMHG

## 2022-10-17 DIAGNOSIS — J44.1 COPD EXACERBATION (HCC): Primary | ICD-10-CM

## 2022-10-17 LAB
ALBUMIN SERPL-MCNC: 4.2 G/DL (ref 3.5–5.2)
ALBUMIN/GLOB SERPL: 1.6 {RATIO} (ref 1.1–2.2)
ALP SERPL-CCNC: 55 U/L (ref 35–104)
ALT SERPL-CCNC: 7 U/L (ref 10–35)
ANION GAP SERPL CALC-SCNC: 12 MMOL/L (ref 5–15)
AST SERPL-CCNC: 17 U/L (ref 10–35)
BASOPHILS # BLD: 0 K/UL (ref 0–0.1)
BASOPHILS NFR BLD: 0 % (ref 0–1)
BILIRUB SERPL-MCNC: 0.2 MG/DL (ref 0.2–1)
BUN SERPL-MCNC: 10 MG/DL (ref 6–20)
BUN/CREAT SERPL: 16 (ref 12–20)
CALCIUM SERPL-MCNC: 9.2 MG/DL (ref 8.6–10)
CHLORIDE SERPL-SCNC: 104 MMOL/L (ref 98–107)
CO2 SERPL-SCNC: 27 MMOL/L (ref 22–29)
COVID-19 RAPID TEST, COVR: NOT DETECTED
CREAT SERPL-MCNC: 0.63 MG/DL (ref 0.5–0.9)
DIFFERENTIAL METHOD BLD: NORMAL
EOSINOPHIL # BLD: 0.2 K/UL (ref 0–0.4)
EOSINOPHIL NFR BLD: 2 % (ref 0–7)
ERYTHROCYTE [DISTWIDTH] IN BLOOD BY AUTOMATED COUNT: 12.9 % (ref 11.5–14.5)
FLUAV AG NPH QL IA: NEGATIVE
FLUBV AG NOSE QL IA: NEGATIVE
GLOBULIN SER CALC-MCNC: 2.7 G/DL (ref 2–4)
GLUCOSE SERPL-MCNC: 108 MG/DL (ref 65–100)
HCT VFR BLD AUTO: 43.2 % (ref 35–47)
HGB BLD-MCNC: 14.5 G/DL (ref 11.5–16)
IMM GRANULOCYTES # BLD AUTO: 0 K/UL (ref 0–0.04)
IMM GRANULOCYTES NFR BLD AUTO: 0 % (ref 0–0.5)
LYMPHOCYTES # BLD: 2.1 K/UL (ref 0.8–3.5)
LYMPHOCYTES NFR BLD: 28 % (ref 12–49)
MAGNESIUM SERPL-MCNC: 1.8 MG/DL (ref 1.6–2.6)
MCH RBC QN AUTO: 33.1 PG (ref 26–34)
MCHC RBC AUTO-ENTMCNC: 33.6 G/DL (ref 30–36.5)
MCV RBC AUTO: 98.6 FL (ref 80–99)
MONOCYTES # BLD: 0.6 K/UL (ref 0–1)
MONOCYTES NFR BLD: 9 % (ref 5–13)
NEUTS SEG # BLD: 4.5 K/UL (ref 1.8–8)
NEUTS SEG NFR BLD: 61 % (ref 32–75)
NRBC # BLD: 0 K/UL (ref 0–0.01)
NRBC BLD-RTO: 0 PER 100 WBC
PLATELET # BLD AUTO: 223 K/UL (ref 150–400)
PMV BLD AUTO: 9.6 FL (ref 8.9–12.9)
POTASSIUM SERPL-SCNC: 4.1 MMOL/L (ref 3.5–5.1)
PROCALCITONIN SERPL-MCNC: <0.05 NG/ML
PROT SERPL-MCNC: 6.9 G/DL (ref 6.4–8.3)
RBC # BLD AUTO: 4.38 M/UL (ref 3.8–5.2)
SODIUM SERPL-SCNC: 143 MMOL/L (ref 136–145)
SOURCE, COVRS: NORMAL
WBC # BLD AUTO: 7.4 K/UL (ref 3.6–11)

## 2022-10-17 PROCEDURE — 74011636637 HC RX REV CODE- 636/637: Performed by: NURSE PRACTITIONER

## 2022-10-17 PROCEDURE — 74011000250 HC RX REV CODE- 250: Performed by: NURSE PRACTITIONER

## 2022-10-17 PROCEDURE — 36415 COLL VENOUS BLD VENIPUNCTURE: CPT

## 2022-10-17 PROCEDURE — 84145 PROCALCITONIN (PCT): CPT

## 2022-10-17 PROCEDURE — 87804 INFLUENZA ASSAY W/OPTIC: CPT

## 2022-10-17 PROCEDURE — 83735 ASSAY OF MAGNESIUM: CPT

## 2022-10-17 PROCEDURE — 99284 EMERGENCY DEPT VISIT MOD MDM: CPT

## 2022-10-17 PROCEDURE — 71046 X-RAY EXAM CHEST 2 VIEWS: CPT

## 2022-10-17 PROCEDURE — 87635 SARS-COV-2 COVID-19 AMP PRB: CPT

## 2022-10-17 PROCEDURE — 80053 COMPREHEN METABOLIC PANEL: CPT

## 2022-10-17 PROCEDURE — 85025 COMPLETE CBC W/AUTO DIFF WBC: CPT

## 2022-10-17 PROCEDURE — 94640 AIRWAY INHALATION TREATMENT: CPT

## 2022-10-17 RX ORDER — ALBUTEROL SULFATE 0.83 MG/ML
2.5 SOLUTION RESPIRATORY (INHALATION)
Qty: 20 EACH | Refills: 0 | Status: SHIPPED | OUTPATIENT
Start: 2022-10-17

## 2022-10-17 RX ORDER — PREDNISONE 20 MG/1
60 TABLET ORAL
Status: COMPLETED | OUTPATIENT
Start: 2022-10-17 | End: 2022-10-17

## 2022-10-17 RX ORDER — IPRATROPIUM BROMIDE AND ALBUTEROL SULFATE 2.5; .5 MG/3ML; MG/3ML
3 SOLUTION RESPIRATORY (INHALATION)
Status: COMPLETED | OUTPATIENT
Start: 2022-10-17 | End: 2022-10-17

## 2022-10-17 RX ORDER — AZITHROMYCIN 250 MG/1
250 TABLET, FILM COATED ORAL DAILY
Qty: 6 TABLET | Refills: 0 | Status: SHIPPED | OUTPATIENT
Start: 2022-10-17

## 2022-10-17 RX ORDER — PREDNISONE 5 MG/1
TABLET ORAL
Qty: 21 TABLET | Refills: 0 | Status: SHIPPED | OUTPATIENT
Start: 2022-10-17

## 2022-10-17 RX ADMIN — PREDNISONE 60 MG: 20 TABLET ORAL at 15:33

## 2022-10-17 RX ADMIN — IPRATROPIUM BROMIDE AND ALBUTEROL SULFATE 3 ML: .5; 3 SOLUTION RESPIRATORY (INHALATION) at 15:33

## 2022-10-17 NOTE — Clinical Note
[address]  [facility] EMERGENCY DEPT  [address] [id number]  [phone]    Work/School Note    Date: 10/17/2022    To Whom It May concern:      Adelita Pepe was seen and treated today in the emergency room by the following provider(s):  Attending Provider: Christopher Faustin MD  Nurse Practitioner: Barbara Cali NP. Adelita Pepe is excused from work/school on 10/17/22. She is clear to return to work/school on 10/18/22.         Sincerely,          Cee Albert NP

## 2022-10-17 NOTE — ED TRIAGE NOTES
Patient arrives with c/o SOB. States after waking up, she had increasing shortness of breath, and states pulse ox read \"83%\" on RA at home. Patient's SpO2 in triage: 93% on RA. Reports intermittent light-headedness yesterday. Denies chest pain, N/V/D, fever. Reports hx of COPD, and did breathing treatment at home prior to arrival.    Patient states was diagnosed with pneumonia last week and is currently taking antibiotics.

## 2022-10-17 NOTE — Clinical Note
[address]  [facility] EMERGENCY DEPT  [address] [phone]  [phone]    Work/School Note    Date: 10/17/2022    To Whom It May concern:      Raza Win was seen and treated today in the emergency room by the following provider(s):  Attending Provider: Dayana Dunne MD  Nurse Practitioner: Edith Montelongo NP. Raza Win is excused from work/school on 10/17/22. She is clear to return to work/school on 10/18/22. You were seen in the emergency room with increased shortness of breath. Your chest x-ray did not show any infection. COVID/ FLU neg. Due to your COPD we will be treating you with an antibiotic called azithromycin, please take all 5 days of this antibiotic. We are also sending you home with a steroid, and more nebulizer solution. Please refrain from smoking during this time. Continue to help monitor your oxygen levels at home, and return to ER if they are persistently lower than 90%.      Sincerely,          Geno Perez NP

## 2022-10-17 NOTE — DISCHARGE INSTRUCTIONS
You were seen in the emergency room with increased shortness of breath. Your chest x-ray did not show any infection. COVID/ FLU neg. Due to your COPD we will be treating you with an antibiotic called azithromycin, please take all 5 days of this antibiotic. We are also sending you home with a steroid, and more nebulizer solution. Please refrain from smoking during this time. Continue to help monitor your oxygen levels at home, and return to ER if they are persistently lower than 90%.

## 2022-10-17 NOTE — ED PROVIDER NOTES
Patient is a 63 y.o. F with hx pertinent for COPD and recent pneumonia who presents today with complaints of SOB. Recently seen at [facility] ED started on antibiotics for pneumonia, does not know which antibiotic this was, but has not been consistently taking it. She has had increasing shortness of breath over the last 2 days, and saturations were in the low 80s this AM per patient report. They did a nebulizer treatment, and then came in. Patient states that she is still smoking, but trying to quit, last cigarette was at 1400. Does not wear home oxygen. PMH: COPD, HTN  Surgical History: None  Smoking: trying to quit but long standing smoking hx   Alcohol: None  Drug Use: None  ALLERGIES: Macrobid [nitrofurantoin monohyd/m-cryst]    Past Medical History:   Diagnosis Date    Anxiety and depression     COPD (chronic obstructive pulmonary disease) (HCC)     High cholesterol     Hypertension      Past Surgical History:   Procedure Laterality Date    HX  SECTION      x 2    HX COLONOSCOPY  2012    repeat in 5 years    HX HIP REPLACEMENT Left     HX HIP REPLACEMENT Right     HX HYSTERECTOMY      HX OOPHORECTOMY      HX ORTHOPAEDIC Left     release of ulnar nerve      Family History:   Adopted: Yes   Problem Relation Age of Onset    COPD Mother     Prostate Cancer Father     Diabetes Father     Lung Cancer Brother     Diabetes Brother      Social History     Socioeconomic History    Marital status:      Spouse name: Not on file    Number of children: Not on file    Years of education: Not on file    Highest education level: Not on file   Occupational History    Not on file   Tobacco Use    Smoking status: Every Day     Packs/day: 0.50     Years: 48.00     Pack years: 24.00     Types: Cigarettes     Start date:     Smokeless tobacco: Never   Vaping Use    Vaping Use: Never used   Substance and Sexual Activity    Alcohol use:  Yes     Alcohol/week: 21.0 standard drinks     Types: 21 Cans of beer per week    Drug use: Yes     Types: Marijuana    Sexual activity: Not on file   Other Topics Concern    Not on file   Social History Narrative    Not on file     Social Determinants of Health     Financial Resource Strain: Low Risk     Difficulty of Paying Living Expenses: Not hard at all   Food Insecurity: No Food Insecurity    Worried About Running Out of Food in the Last Year: Never true    Ran Out of Food in the Last Year: Never true   Transportation Needs: Not on file   Physical Activity: Not on file   Stress: Not on file   Social Connections: Not on file   Intimate Partner Violence: Not on file   Housing Stability: Not on file             Review of Systems   Constitutional:  Negative for fever. HENT:  Negative for congestion. Eyes:  Negative for discharge. Respiratory:  Positive for shortness of breath. Cardiovascular:  Negative for chest pain. Gastrointestinal:  Negative for nausea. Genitourinary:  Negative for decreased urine volume. Musculoskeletal:  Negative for myalgias. Neurological:  Negative for seizures. Hematological:  Does not bruise/bleed easily. Psychiatric/Behavioral:  Negative for behavioral problems. Vitals:    10/17/22 1520 10/17/22 1527 10/17/22 1542 10/17/22 1557   BP:  (!) 144/73 128/78    Pulse:       Resp:       Temp:       SpO2: 94% 92% 98% 92%   Weight:       Height:                Physical Exam  Constitutional:       Appearance: Normal appearance. HENT:      Head: Normocephalic and atraumatic. Eyes:      Pupils: Pupils are equal, round, and reactive to light. Cardiovascular:      Rate and Rhythm: Normal rate and regular rhythm. Pulmonary:      Effort: Tachypnea present. Breath sounds: Examination of the right-upper field reveals wheezing. Examination of the left-upper field reveals wheezing. Examination of the right-middle field reveals wheezing. Examination of the left-middle field reveals wheezing.  Examination of the right-lower field reveals rhonchi. Examination of the left-lower field reveals rhonchi. Wheezing and rhonchi present. Abdominal:      General: Abdomen is flat. Musculoskeletal:      Cervical back: Neck supple. Skin:     General: Skin is dry. Neurological:      Mental Status: She is alert. Mental status is at baseline. Psychiatric:         Mood and Affect: Mood normal.         Behavior: Behavior normal.            LABORATORY RESULTS:  Recent Results (from the past 24 hour(s))   METABOLIC PANEL, COMPREHENSIVE    Collection Time: 10/17/22  3:22 PM   Result Value Ref Range    Sodium 143 136 - 145 mmol/L    Potassium 4.1 3.5 - 5.1 mmol/L    Chloride 104 98 - 107 mmol/L    CO2 27 22 - 29 mmol/L    Anion gap 12 5 - 15 mmol/L    Glucose 108 (H) 65 - 100 mg/dL    BUN 10 6 - 20 MG/DL    Creatinine 0.63 0.50 - 0.90 MG/DL    BUN/Creatinine ratio 16 12 - 20      eGFR >60 >60 ml/min/1.73m2    Calcium 9.2 8.6 - 10.0 MG/DL    Bilirubin, total 0.2 0.2 - 1.0 MG/DL    ALT (SGPT) 7 (L) 10 - 35 U/L    AST (SGOT) 17 10 - 35 U/L    Alk. phosphatase 55 35 - 104 U/L    Protein, total 6.9 6.4 - 8.3 g/dL    Albumin 4.2 3.5 - 5.2 g/dL    Globulin 2.7 2.0 - 4.0 g/dL    A-G Ratio 1.6 1.1 - 2.2     MAGNESIUM    Collection Time: 10/17/22  3:22 PM   Result Value Ref Range    Magnesium 1.8 1.6 - 2.6 mg/dL   CBC WITH AUTOMATED DIFF    Collection Time: 10/17/22  3:22 PM   Result Value Ref Range    WBC 7.4 3.6 - 11.0 K/uL    RBC 4.38 3.80 - 5.20 M/uL    HGB 14.5 11.5 - 16.0 g/dL    HCT 43.2 35.0 - 47.0 %    MCV 98.6 80.0 - 99.0 FL    MCH 33.1 26.0 - 34.0 PG    MCHC 33.6 30.0 - 36.5 g/dL    RDW 12.9 11.5 - 14.5 %    PLATELET 019 546 - 850 K/uL    MPV 9.6 8.9 - 12.9 FL    NRBC 0.0 0  WBC    ABSOLUTE NRBC 0.00 0.00 - 0.01 K/uL    NEUTROPHILS 61 32 - 75 %    LYMPHOCYTES 28 12 - 49 %    MONOCYTES 9 5 - 13 %    EOSINOPHILS 2 0 - 7 %    BASOPHILS 0 0 - 1 %    IMMATURE GRANULOCYTES 0 0.0 - 0.5 %    ABS. NEUTROPHILS 4.5 1.8 - 8.0 K/UL    ABS.  LYMPHOCYTES 2.1 0.8 - 3.5 K/UL    ABS. MONOCYTES 0.6 0.0 - 1.0 K/UL    ABS. EOSINOPHILS 0.2 0.0 - 0.4 K/UL    ABS. BASOPHILS 0.0 0.0 - 0.1 K/UL    ABS. IMM. GRANS. 0.0 0.00 - 0.04 K/UL    DF AUTOMATED     COVID-19 RAPID TEST    Collection Time: 10/17/22  3:29 PM   Result Value Ref Range    Specimen source Nasopharyngeal      COVID-19 rapid test Not detected NOTD     INFLUENZA A+B VIRAL AGS    Collection Time: 10/17/22  3:30 PM   Result Value Ref Range    Influenza A Antigen Negative NEG      Influenza B Antigen Negative NEG         IMAGING RESULTS:  XR CHEST PA LAT    Result Date: 10/17/2022  No acute process seen. Lung hyperinflation. EKG INTERPRETATION:   See course summary for interpretation    MEDICATIONS GIVEN:  Medications   albuterol-ipratropium (DUO-NEB) 2.5 MG-0.5 MG/3 ML (3 mL Nebulization Given 10/17/22 1533)   predniSONE (DELTASONE) tablet 60 mg (60 mg Oral Given 10/17/22 1533)            MDM     Amount and/or Complexity of Data Reviewed  Clinical lab tests: reviewed  Tests in the radiology section of CPT®: reviewed           Discussed results and work-up with patient and answered all questions, the patient expresses understanding and agrees with the care plan and disposition. The patient was given an opportunity to ask questions and all concerns raised were addressed prior to discharge. Recommended patient follow-up with provider as listed below. Counseled patient on standard home and self-care measures. Specifically explained the emergent conditions that could arise and clearly instructed the patient to return to the emergency department for those and any other new, worsening, or concerning symptoms. Patient stable and ready for discharge. IMPRESSION:  1.  COPD exacerbation ([facility] [address]        DISPOSITION:  Discharge    PLAN:  Follow-up Information       Follow up With Specialties Details Why Contact Info    Artemio Key NP Nurse Practitioner  As needed, If symptoms worsen [address]  [place] [address] [address]  [phone]      [facility]GENCY DEPT Emergency Medicine  As needed, If symptoms worsen [address] [place] [address]  [phone]          Current Discharge Medication List        START taking these medications    Details   albuterol (PROVENTIL VENTOLIN) 2.5 mg /3 mL (0.083 %) nebu 3 mL by Nebulization route every four (4) hours as needed for Wheezing or Shortness of Breath. Qty: 20 Each, Refills: 0  Start date: 10/17/2022      predniSONE (STERAPRED) 5 mg dose pack See administration instruction per 5mg dose pack  Qty: 21 Tablet, Refills: 0  Start date: 10/17/2022      azithromycin (Zithromax Z-Evin) 250 mg tablet Take 1 Tablet by mouth daily.   Qty: 6 Tablet, Refills: 0  Start date: 10/17/2022

## 2022-10-17 NOTE — ED TRIAGE NOTES
Pt arrives in the ED ambulatory with complaints of SOB x several days. Reports low o2 in the am.  Pt reports history of COPD.

## 2022-10-24 DIAGNOSIS — Z72.0 TOBACCO ABUSE: ICD-10-CM

## 2022-10-26 RX ORDER — VARENICLINE TARTRATE 1 MG/1
1 TABLET, FILM COATED ORAL 2 TIMES DAILY
Qty: 60 TABLET | Refills: 2 | Status: SHIPPED | OUTPATIENT
Start: 2022-10-26 | End: 2022-11-10

## 2022-11-08 ENCOUNTER — PATIENT OUTREACH (OUTPATIENT)
Dept: CASE MANAGEMENT | Age: 62
End: 2022-11-08

## 2022-11-08 NOTE — PROGRESS NOTES
11/8/2022  11:02 AM    Patient outreach attempt by this ACM today to perform CCM assessment. Attempts to reach patient were unsuccessful. Left voicemail requesting call back and ACM contact information.      Shyanne Hodges RN, BSN - Ambulatory Care Manager  [phone]

## 2022-11-08 NOTE — LETTER
11/9/2022 9:37 AM    Ms. Starr Leija  [address] [address]  [address] [address]        My name is Wilton Cervantes, and I am a Care Manager with [facility]. I often work with patients who could benefit from additional support understanding and managing their health. We are committed to providing you excellent care. I have been unable to reach you on this at [phone]. Please contact me at [phone] if you would like additional help with community resources. We appreciate the confidence you've shown by selecting us to provide your healthcare needs and I look forward to hearing from you soon.            Sincerely,      Marium Connor

## 2022-11-09 RX ORDER — BUDESONIDE AND FORMOTEROL FUMARATE DIHYDRATE 160; 4.5 UG/1; UG/1
1 AEROSOL RESPIRATORY (INHALATION) DAILY
Qty: 10.2 G | Refills: 1 | Status: SHIPPED | OUTPATIENT
Start: 2022-11-09 | End: 2022-12-01 | Stop reason: SINTOL

## 2022-11-09 NOTE — PROGRESS NOTES
11/9/2022  9:35 AM    Patient outreach attempt by this ACM today to perform CCM assessment. Attempts to reach patient were unsuccessful. Left voicemail requesting call back and ACM contact information. Will send GIT letter and make final attempt.     Gisselle Valdez RN, BSN - Ambulatory Care Manager  [phone]

## 2022-11-10 ENCOUNTER — OFFICE VISIT (OUTPATIENT)
Dept: FAMILY MEDICINE CLINIC | Age: 62
End: 2022-11-10
Payer: MEDICARE

## 2022-11-10 VITALS
BODY MASS INDEX: 26.29 KG/M2 | TEMPERATURE: 97.6 F | OXYGEN SATURATION: 97 % | HEIGHT: 64 IN | HEART RATE: 81 BPM | RESPIRATION RATE: 16 BRPM | SYSTOLIC BLOOD PRESSURE: 132 MMHG | WEIGHT: 154 LBS | DIASTOLIC BLOOD PRESSURE: 78 MMHG

## 2022-11-10 DIAGNOSIS — J44.9 CHRONIC OBSTRUCTIVE PULMONARY DISEASE, UNSPECIFIED COPD TYPE (HCC): ICD-10-CM

## 2022-11-10 DIAGNOSIS — F17.210 CIGARETTE SMOKER: Primary | ICD-10-CM

## 2022-11-10 PROCEDURE — 99214 OFFICE O/P EST MOD 30 MIN: CPT | Performed by: NURSE PRACTITIONER

## 2022-11-10 PROCEDURE — G9717 DOC PT DX DEP/BP F/U NT REQ: HCPCS | Performed by: NURSE PRACTITIONER

## 2022-11-10 PROCEDURE — G8419 CALC BMI OUT NRM PARAM NOF/U: HCPCS | Performed by: NURSE PRACTITIONER

## 2022-11-10 PROCEDURE — G8427 DOCREV CUR MEDS BY ELIG CLIN: HCPCS | Performed by: NURSE PRACTITIONER

## 2022-11-10 PROCEDURE — 99406 BEHAV CHNG SMOKING 3-10 MIN: CPT | Performed by: NURSE PRACTITIONER

## 2022-11-10 PROCEDURE — 3017F COLORECTAL CA SCREEN DOC REV: CPT | Performed by: NURSE PRACTITIONER

## 2022-11-10 RX ORDER — ALBUTEROL SULFATE 90 UG/1
2 AEROSOL, METERED RESPIRATORY (INHALATION)
COMMUNITY

## 2022-11-10 RX ORDER — ESCITALOPRAM OXALATE 20 MG/1
20 TABLET ORAL DAILY
COMMUNITY
End: 2022-11-14 | Stop reason: SDUPTHER

## 2022-11-10 NOTE — PROGRESS NOTES
Chief Complaint   Patient presents with    Other     Smoking cessation    1. \"Have you been to the ER, urgent care clinic since your last visit? Hospitalized since your last visit? \" No    2. \"Have you seen or consulted any other health care providers outside of the [address] [place] [address] since your last visit? \" No     3. For patients aged 39-70: Has the patient had a colonoscopy / FIT/ Cologuard? Yes - no Care Gap present      If the patient is female:    4. For patients aged 41-77: Has the patient had a mammogram within the past 2 years? No      5. For patients aged 21-65: Has the patient had a pap smear?  Yes - no Care Gap present Visit Vitals  /78 (BP 1 Location: Left upper arm, BP Patient Position: Sitting, BP Cuff Size: Adult)   Pulse 81   Temp 97.6 °F (36.4 °C) (Temporal)   Resp 16   Ht 5' 4\" (1.626 m)   Wt 154 lb (69.9 kg)   SpO2 97%   BMI 26.43 kg/m²      3 most recent PHQ Screens 11/10/2022   Little interest or pleasure in doing things Not at all   Feeling down, depressed, irritable, or hopeless Not at all   Total Score PHQ 2 0   Trouble falling or staying asleep, or sleeping too much -   Feeling tired or having little energy -   Poor appetite, weight loss, or overeating -   Feeling bad about yourself - or that you are a failure or have let yourself or your family down -   Trouble concentrating on things such as school, work, reading, or watching TV -   Moving or speaking so slowly that other people could have noticed; or the opposite being so fidgety that others notice -   Thoughts of being better off dead, or hurting yourself in some way -   PHQ 9 Score -   How difficult have these problems made it for you to do your work, take care of your home and get along with others -

## 2022-11-10 NOTE — PROGRESS NOTES
Subjective  Chief Complaint   Patient presents with    Other     Smoking cessation     HPI:  Mary Jo Smoker is a 63 y.o. female. This is an established patient of the practice who is new to provider. Presents for management of smoking cessation. Chantix was started at her last visit. She was doing well with Chantix and cut back to one pack every few days. She stopped taking the medication last Sunday for mood changes and vivid nightmares. Her mood was so severe she almost lost her job. For now she prefers to continue to smoke 1/4 PPD. She is motivated to not increase smoking due to the money she is saving. Last COPD exacerbation 10/2022. Diagnosed at ED and treated outpatient with steroids and antibiotic. Continues to use daily and prn inhalers. Last used Albuterol inhaler one day last week. Reports morning sputum production and cough, SOB with activity- symptoms at baseline. Denies wheezing. Overall feels COPD is well controlled. Past Medical History:   Diagnosis Date    Anxiety and depression     COPD (chronic obstructive pulmonary disease) ([facility])     COPD exacerbation ([facility] 10/2022    High cholesterol     Hypertension      Family History   Adopted: Yes   Problem Relation Age of Onset    COPD Mother     Prostate Cancer Father     Diabetes Father     Lung Cancer Brother     Diabetes Brother      Social History     Socioeconomic History    Marital status:      Spouse name: Not on file    Number of children: Not on file    Years of education: Not on file    Highest education level: Not on file   Occupational History    Not on file   Tobacco Use    Smoking status: Every Day     Packs/day: 0.50     Years: 48.00     Pack years: 24.00     Types: Cigarettes     Start date: 1972    Smokeless tobacco: Never   Vaping Use    Vaping Use: Never used   Substance and Sexual Activity    Alcohol use:  Yes     Alcohol/week: 21.0 standard drinks     Types: 21 Cans of beer per week    Drug use: Yes     Types: Marijuana    Sexual activity: Not on file   Other Topics Concern    Not on file   Social History Narrative    Not on file     Social Determinants of Health     Financial Resource Strain: Low Risk     Difficulty of Paying Living Expenses: Not hard at all   Food Insecurity: No Food Insecurity    Worried About Running Out of Food in the Last Year: Never true    Ran Out of Food in the Last Year: Never true   Transportation Needs: Not on file   Physical Activity: Not on file   Stress: Not on file   Social Connections: Not on file   Intimate Partner Violence: Not on file   Housing Stability: Not on file     Current Outpatient Medications on File Prior to Visit   Medication Sig Dispense Refill    albuterol (PROVENTIL HFA, VENTOLIN HFA, PROAIR HFA) 90 mcg/actuation inhaler Take 2 Puffs by inhalation every four to six (4-6) hours as needed. escitalopram oxalate (LEXAPRO) 20 mg tablet Take 20 mg by mouth daily. budesonide-formoteroL (SYMBICORT) 160-4.5 mcg/actuation HFAA Take 1 Puff by inhalation daily. 10.2 g 1    albuterol (PROVENTIL VENTOLIN) 2.5 mg /3 mL (0.083 %) nebu 3 mL by Nebulization route every four (4) hours as needed for Wheezing or Shortness of Breath. 20 Each 0    Omeprazole delayed release (PRILOSEC D/R) 20 mg tablet Take one tablet by mouth twice a day in the AM and after dinner 180 Tablet 1    tiotropium bromide (SPIRIVA RESPIMAT) 2.5 mcg/actuation inhaler Take 1 Puff by inhalation daily. aspirin-acetaminophen-caffeine (Excedrin Extra Strength) 250-250-65 mg per tablet Take 3 Tablets by mouth daily. [DISCONTINUED] varenicline (Chantix Continuing Month Box) 1 mg tablet Take 1 Tablet by mouth two (2) times a day. 60 Tablet 2    [DISCONTINUED] predniSONE (STERAPRED) 5 mg dose pack See administration instruction per 5mg dose pack (Patient not taking: Reported on 11/10/2022) 21 Tablet 0    [DISCONTINUED] azithromycin (Zithromax Z-Evin) 250 mg tablet Take 1 Tablet by mouth daily.  (Patient not taking: Reported on 11/10/2022) 6 Tablet 0    [DISCONTINUED] escitalopram oxalate (LEXAPRO) 20 mg tablet Take 0.5 Tablets by mouth two (2) times a day. (Patient taking differently: Take 20 mg by mouth daily.) 90 Tablet 1     No current facility-administered medications on file prior to visit. Allergies   Allergen Reactions    Macrobid [Nitrofurantoin Monohyd/M-Cryst] Hives         Objective  Visit Vitals  /78 (BP 1 Location: Left upper arm, BP Patient Position: Sitting, BP Cuff Size: Adult)   Pulse 81   Temp 97.6 °F (36.4 °C) (Temporal)   Resp 16   Ht 5' 4\" (1.626 m)   Wt 154 lb (69.9 kg)   SpO2 97%   BMI 26.43 kg/m²       Physical Exam  Vitals and nursing note reviewed. Constitutional:       General: She is not in acute distress. Appearance: Normal appearance. She is overweight. HENT:      Head: Normocephalic. Eyes:      Extraocular Movements: Extraocular movements intact. Cardiovascular:      Rate and Rhythm: Normal rate and regular rhythm. Heart sounds: Normal heart sounds. Pulmonary:      Effort: Pulmonary effort is normal.      Breath sounds: Normal breath sounds. Musculoskeletal:         General: Normal range of motion. Right lower leg: No edema. Left lower leg: No edema. Skin:     General: Skin is warm and dry. Neurological:      Mental Status: She is alert and oriented to person, place, and time. Psychiatric:         Mood and Affect: Mood normal.         Behavior: Behavior normal.        Assessment & Plan      ICD-10-CM ICD-9-CM    1. Cigarette smoker  F17.210 305.1 [place] SMOKING AND TOBACCO USE CESSATION 3 - 10 MINUTES      2. Chronic obstructive pulmonary disease, unspecified COPD type ([facility]  J44.9 496         Diagnoses and all orders for this visit:    1. Cigarette smoker  The patient was counseled on the dangers of tobacco use, and was advised to quit and reluctant to quit.   Reviewed strategies to maximize success, including removing cigarettes and smoking materials from environment, written materials, and call if we can be of assistance in the future . -     [place] SMOKING AND TOBACCO USE CESSATION 3 - 10 MINUTES    2. Chronic obstructive pulmonary disease, unspecified COPD type ([facility] [address]  Symptoms have returned to baseline following exacerbation in October. No changes to the current treatment plan. Discussed the importance of smoking cessation. Aspects of this note may have been generated using voice recognition software. Despite editing, there may be some syntax errors. Follow-up and Dispositions    Return if symptoms worsen or fail to improve, for as scheduled 3/1/2023 with TSS. I have discussed the diagnosis with the patient and the intended plan as seen in the above orders. The patient has received an after-visit summary and questions were answered concerning future plans. I have discussed medication side effects and warnings with the patient as well.     Faith Perez NP

## 2022-11-14 NOTE — TELEPHONE ENCOUNTER
Reason for call: Pt calling--she said it is hard for her to work without her Lexapro and she said that the pharmacy said we cancelled it, she said she also needs Chantix refilled to her pharmacy but I do not see it on file. escitalopram oxalate (LEXAPRO) 20 mg tablet 20 mg, DAILY   EditCancel Reorder      Summary: Take 20 mg by mouth daily. Dose, Route, Frequency: 20 mg, Oral, DAILY  Ord/Sold: 11/10/2022 (O)  Report  Taking:   Long-term:   Med Dose History       Patient Sig: Take 20 mg by mouth daily.        Ordered on: 11/10/2022       Authorized by: PROVIDER, STACIA        [id number] [facility] [address]  [phone]    Is this a new problem: yes     Date of last appointment:  11/10/2022     Can we respond via [employer]: no    Best call back number: [phone]

## 2022-11-14 NOTE — TELEPHONE ENCOUNTER
Lexapro was discontinued by [facility] as it seems pt stated at time of visit she was not taking medication.

## 2022-11-15 RX ORDER — ESCITALOPRAM OXALATE 20 MG/1
20 TABLET ORAL DAILY
Qty: 90 TABLET | Refills: 0 | Status: SHIPPED | OUTPATIENT
Start: 2022-11-15

## 2022-12-01 ENCOUNTER — HOSPITAL ENCOUNTER (EMERGENCY)
Age: 62
Discharge: HOME | End: 2022-12-01
Attending: EMERGENCY MEDICINE
Payer: MEDICARE

## 2022-12-01 VITALS
HEIGHT: 64 IN | SYSTOLIC BLOOD PRESSURE: 161 MMHG | WEIGHT: 150 LBS | RESPIRATION RATE: 18 BRPM | TEMPERATURE: 99.5 F | HEART RATE: 85 BPM | BODY MASS INDEX: 25.61 KG/M2 | OXYGEN SATURATION: 98 % | DIASTOLIC BLOOD PRESSURE: 93 MMHG

## 2022-12-01 DIAGNOSIS — U07.1 COVID-19 VIRUS INFECTION: Primary | ICD-10-CM

## 2022-12-01 LAB
COVID-19 RAPID TEST, COVR: ABNORMAL
COVID-19 RAPID TEST, COVR: DETECTED
SOURCE, COVRS: ABNORMAL
SOURCE, COVRS: ABNORMAL

## 2022-12-01 PROCEDURE — 99283 EMERGENCY DEPT VISIT LOW MDM: CPT

## 2022-12-01 PROCEDURE — 87635 SARS-COV-2 COVID-19 AMP PRB: CPT

## 2022-12-01 NOTE — ED PROVIDER NOTES
63 yoF with a hx of COPD, HTN, presenting to ED for confirmation of positive home COVID test.  Symptoms started yesterday. HA, cough, chest congestion. Multiple family members positive for COVID. Pt is vaccinated against COVID-19. Pt has had some dizziness and mild HA. Meds taken: excederin. Past Medical History:   Diagnosis Date    Anxiety and depression     COPD (chronic obstructive pulmonary disease) ([facility])     COPD exacerbation ([facility] [address] 10/2022    High cholesterol     Hypertension        Past Surgical History:   Procedure Laterality Date    HX  SECTION      x 2    HX COLONOSCOPY  2012    repeat in 5 years    HX HIP REPLACEMENT Left     HX HIP REPLACEMENT Right     HX HYSTERECTOMY      HX OOPHORECTOMY      HX ORTHOPAEDIC Left     release of ulnar nerve          Family History:   Adopted: Yes   Problem Relation Age of Onset    COPD Mother     Prostate Cancer Father     Diabetes Father     Lung Cancer Brother     Diabetes Brother        Social History     Socioeconomic History    Marital status:      Spouse name: Not on file    Number of children: Not on file    Years of education: Not on file    Highest education level: Not on file   Occupational History    Not on file   Tobacco Use    Smoking status: Every Day     Packs/day: 0.50     Years: 48.00     Pack years: 24.00     Types: Cigarettes     Start date:     Smokeless tobacco: Never   Vaping Use    Vaping Use: Never used   Substance and Sexual Activity    Alcohol use:  Yes     Alcohol/week: 21.0 standard drinks     Types: 21 Cans of beer per week    Drug use: Yes     Types: Marijuana    Sexual activity: Not on file   Other Topics Concern    Not on file   Social History Narrative    Not on file     Social Determinants of Health     Financial Resource Strain: Low Risk     Difficulty of Paying Living Expenses: Not hard at all   Food Insecurity: No Food Insecurity    Worried About [address] in the Last Year: Never true    Ran Out of Food in the Last Year: Never true   Transportation Needs: Not on file   Physical Activity: Not on file   Stress: Not on file   Social Connections: Not on file   Intimate Partner Violence: Not on file   Housing Stability: Not on file         ALLERGIES: Macrobid [nitrofurantoin monohyd/m-cryst]    Review of Systems   Constitutional:  Negative for fever. HENT:  Positive for congestion. Respiratory:  Positive for cough. Negative for shortness of breath. Cardiovascular:  Negative for chest pain. Gastrointestinal:  Negative for abdominal pain. Skin:  Negative for wound. Allergic/Immunologic: Negative for immunocompromised state. Neurological:  Positive for headaches. Psychiatric/Behavioral:  Negative for confusion. Vitals:    12/01/22 1323   BP: (!) 161/93   Pulse: 85   Resp: 18   Temp: 99.5 °F (37.5 °C)   SpO2: 98%   Weight: 68 kg (150 lb)   Height: 5' 4\" (1.626 m)            Physical Exam  Vitals and nursing note reviewed. Constitutional:       General: She is not in acute distress. Appearance: She is well-developed. HENT:      Head: Normocephalic and atraumatic. Mouth/Throat:      Mouth: Mucous membranes are moist.   Eyes:      General: No scleral icterus. Extraocular Movements: Extraocular movements intact. Neck:      Trachea: No tracheal deviation. Cardiovascular:      Rate and Rhythm: Normal rate and regular rhythm. Pulses: Normal pulses. Heart sounds: Normal heart sounds. Pulmonary:      Effort: Pulmonary effort is normal. No respiratory distress. Breath sounds: Normal breath sounds. Abdominal:      General: There is no distension. Palpations: Abdomen is soft. Tenderness: There is no abdominal tenderness. Musculoskeletal:         General: Normal range of motion. Skin:     General: Skin is warm and dry. Neurological:      Mental Status: She is alert and oriented to person, place, and time. Cranial Nerves: No cranial nerve deficit. Psychiatric:         Mood and Affect: Mood normal.        MDM  Number of Diagnoses or Management Options  COVID-19 virus infection  Diagnosis management comments: 63year old female presenting with signs and symptoms of upper respiratory illness. +COVID-19. No respiratory distress or markers of sepsis present. This patient is appropriate to treat symptomatically with over-the-counter therapies and have follow-up with PCP if not improving.     Orders Placed This Encounter      COVID-19 RAPID TEST      COVID-19 RAPID TEST      nirmatrelvir-ritonavir (Paxlovid, EUA,) 300 mg (150 mg x 2)-100 mg            ED Course as of 12/01/22 1454   Thu Dec 01, 2022   1433 COVID-19 rapid test(!): Indeterminate [SL]   1452 COVID-19 rapid test(!!): Detected [SL]      ED Course User Index  [SL] Norma Dee MD       Procedures

## 2022-12-01 NOTE — ED TRIAGE NOTES
Patient arrives with c/o headache, cough, chest congestion. Sx present for past few days. States had positive covid rapid test at home. States family at home tested positive for covid this week. Reports that employer stated the home test was not \"valid\" and needs a positive test from a clinic. Patient is A&O x 4.

## 2022-12-01 NOTE — Clinical Note
[address]  [facility] EMERGENCY DEPT  [address] [id number]  [phone]    Work/School Note    Date: 12/1/2022     To Whom It May concern:    Ronda Cabot was evaluated by the following provider(s):  Attending Provider: Julia Sweeney MD.   Miguel Santoro virus is suspected. Per the CDC guidelines we recommend home isolation until the following conditions are all met:    1. At least five days have passed since symptoms first appeared and/or had a close exposure,   2. After home isolation for five days, wearing a mask around others for the next five days,  3. At least 24 have passed since last fever without the use of fever-reducing medications and  4.  Symptoms (eg cough, shortness of breath) have improved      Sincerely,          Mary Lewis MD
[address]  [facility] EMERGENCY DEPT  [address] [phone]  [phone]    Work/School Note    Date: 12/1/2022    To Whom It May concern:    Wagner Nixon was seen and treated today in the emergency room by the following provider(s):  Attending Provider: Karen Samuels MD.      Wagner Nixon is excused from work/school on 12/1/2022 through 12/3/2022. She is medically clear to return to work/school on 12/4/2022.          Sincerely,          Harjinder Orellana MD
[address]  [facility] & [facility] EMERGENCY DEPT  [address] [phone]  [phone]    Work/School Note    Date: 12/1/2022     To Whom It May concern:    Martin Doyle was evaluated by the following provider(s):  Attending Provider: Winsome Zavaleta MD.   Kulwant Maicol virus is suspected. Per the CDC guidelines we recommend home isolation until the following conditions are all met:    1. At least five days have passed since symptoms first appeared and/or had a close exposure,   2. After home isolation for five days, wearing a mask around others for the next five days,  3. At least 24 have passed since last fever without the use of fever-reducing medications and  4.  Symptoms (eg cough, shortness of breath) have improved      Sincerely,          Brenden Solis MD
Quality 226: Preventive Care And Screening: Tobacco Use: Screening And Cessation Intervention: Tobacco Screening not Performed for Medical Reasons
Detail Level: Detailed
Quality 431: Preventive Care And Screening: Unhealthy Alcohol Use - Screening: Unhealthy alcohol use screening not performed, reason not otherwise specified
Quality 110: Preventive Care And Screening: Influenza Immunization: Influenza immunization was not ordered or administered, reason not given

## 2022-12-01 NOTE — ED NOTES
D/c by provider. Work note provided & RX sent electronically. Pt ambulatory out of ED w/ daughter in NAD.

## 2022-12-27 ENCOUNTER — PATIENT OUTREACH (OUTPATIENT)
Dept: CASE MANAGEMENT | Age: 62
End: 2022-12-27

## 2022-12-28 NOTE — PROGRESS NOTES
Ambulatory Care Management Note      Patient has graduated from the Complex Case Management program on 12/28/2022. Multiple unsuccessful attempts have been made to contact patient. Ambulatory Care Management get in touch letter mailed to the patients address on file. Care management goals have been completed. No further Ambulatory Care Manager follow up scheduled. Patient has Ambulatory Care Manager's contact information for any further questions, concerns, or needs.     Patients upcoming visits:    Future Appointments   Date Time Provider Hemant Masters   3/1/2023  9:00 [place] [address] [address], RN, BSN - Ambulatory Care Manager  [phone]

## 2023-02-20 RX ORDER — ESCITALOPRAM OXALATE 20 MG/1
TABLET ORAL
Qty: 90 TABLET | Refills: 0 | Status: SHIPPED | OUTPATIENT
Start: 2023-02-20

## 2023-09-05 ENCOUNTER — TELEPHONE (OUTPATIENT)
Facility: CLINIC | Age: 63
End: 2023-09-05

## 2023-09-05 NOTE — TELEPHONE ENCOUNTER
Patient would like refills on Lexapro 20 mg , Spiriva , and Omeprazole 20 mg. Patient has an appointment scheduled with [facility] on 9/26/23 .

## 2023-09-06 RX ORDER — OMEPRAZOLE 20 MG/1
TABLET, DELAYED RELEASE ORAL
Qty: 90 TABLET | Refills: 1 | Status: SHIPPED | OUTPATIENT
Start: 2023-09-06

## 2023-09-06 RX ORDER — ESCITALOPRAM OXALATE 20 MG/1
20 TABLET ORAL DAILY
Qty: 90 TABLET | Refills: 1 | Status: SHIPPED | OUTPATIENT
Start: 2023-09-06

## 2023-09-26 ENCOUNTER — OFFICE VISIT (OUTPATIENT)
Facility: CLINIC | Age: 63
End: 2023-09-26
Payer: COMMERCIAL

## 2023-09-26 VITALS
SYSTOLIC BLOOD PRESSURE: 118 MMHG | TEMPERATURE: 97.3 F | WEIGHT: 151.25 LBS | RESPIRATION RATE: 16 BRPM | HEIGHT: 64 IN | OXYGEN SATURATION: 92 % | HEART RATE: 80 BPM | DIASTOLIC BLOOD PRESSURE: 78 MMHG | BODY MASS INDEX: 25.82 KG/M2

## 2023-09-26 DIAGNOSIS — Z13.1 DIABETES MELLITUS SCREENING: ICD-10-CM

## 2023-09-26 DIAGNOSIS — F17.200 TOBACCO DEPENDENCE: ICD-10-CM

## 2023-09-26 DIAGNOSIS — Z01.419 WELL WOMAN EXAM: Primary | ICD-10-CM

## 2023-09-26 DIAGNOSIS — E78.2 MIXED HYPERLIPIDEMIA: ICD-10-CM

## 2023-09-26 DIAGNOSIS — Z87.891 PERSONAL HISTORY OF TOBACCO USE: ICD-10-CM

## 2023-09-26 DIAGNOSIS — Z11.4 SCREENING FOR HIV (HUMAN IMMUNODEFICIENCY VIRUS): ICD-10-CM

## 2023-09-26 DIAGNOSIS — J44.1 CHRONIC OBSTRUCTIVE PULMONARY DISEASE WITH ACUTE EXACERBATION (HCC): ICD-10-CM

## 2023-09-26 PROCEDURE — 99214 OFFICE O/P EST MOD 30 MIN: CPT | Performed by: FAMILY MEDICINE

## 2023-09-26 PROCEDURE — 99396 PREV VISIT EST AGE 40-64: CPT | Performed by: FAMILY MEDICINE

## 2023-09-26 PROCEDURE — G0296 VISIT TO DETERM LDCT ELIG: HCPCS | Performed by: FAMILY MEDICINE

## 2023-09-26 RX ORDER — ALBUTEROL SULFATE 90 UG/1
2 AEROSOL, METERED RESPIRATORY (INHALATION) EVERY 4 HOURS PRN
Qty: 18 G | Refills: 5 | Status: SHIPPED | OUTPATIENT
Start: 2023-09-26

## 2023-09-26 RX ORDER — OMEPRAZOLE 20 MG/1
CAPSULE, DELAYED RELEASE ORAL
COMMUNITY
Start: 2023-09-06

## 2023-09-26 RX ORDER — PREDNISONE 20 MG/1
60 TABLET ORAL DAILY
Qty: 15 TABLET | Refills: 0 | Status: SHIPPED | OUTPATIENT
Start: 2023-09-26 | End: 2023-10-01

## 2023-09-26 RX ORDER — AZITHROMYCIN 250 MG/1
250 TABLET, FILM COATED ORAL SEE ADMIN INSTRUCTIONS
Qty: 6 TABLET | Refills: 0 | Status: SHIPPED | OUTPATIENT
Start: 2023-09-26 | End: 2023-10-01

## 2023-09-27 LAB
ALBUMIN SERPL-MCNC: 4.2 G/DL (ref 3.8–4.9)
ALBUMIN/GLOB SERPL: 1.6 {RATIO} (ref 1.2–2.2)
ALP SERPL-CCNC: 66 IU/L (ref 44–121)
ALT SERPL-CCNC: 10 IU/L (ref 0–32)
AST SERPL-CCNC: 14 IU/L (ref 0–40)
BILIRUB SERPL-MCNC: 0.3 MG/DL (ref 0–1.2)
BUN SERPL-MCNC: 6 MG/DL (ref 8–27)
BUN/CREAT SERPL: 10 (ref 12–28)
CALCIUM SERPL-MCNC: 9.7 MG/DL (ref 8.7–10.3)
CHLORIDE SERPL-SCNC: 105 MMOL/L (ref 96–106)
CHOLEST SERPL-MCNC: 201 MG/DL (ref 100–199)
CO2 SERPL-SCNC: 24 MMOL/L (ref 20–29)
CREAT SERPL-MCNC: 0.63 MG/DL (ref 0.57–1)
EGFRCR SERPLBLD CKD-EPI 2021: 101 ML/MIN/1.73
GLOBULIN SER CALC-MCNC: 2.6 G/DL (ref 1.5–4.5)
GLUCOSE SERPL-MCNC: 94 MG/DL (ref 70–99)
HBA1C MFR BLD: 5.4 % (ref 4.8–5.6)
HDLC SERPL-MCNC: 95 MG/DL
HIV 1+2 AB+HIV1 P24 AG SERPL QL IA: NON REACTIVE
LDLC SERPL CALC-MCNC: 94 MG/DL (ref 0–99)
POTASSIUM SERPL-SCNC: 4.2 MMOL/L (ref 3.5–5.2)
PROT SERPL-MCNC: 6.8 G/DL (ref 6–8.5)
SODIUM SERPL-SCNC: 145 MMOL/L (ref 134–144)
TRIGL SERPL-MCNC: 67 MG/DL (ref 0–149)
VLDLC SERPL CALC-MCNC: 12 MG/DL (ref 5–40)

## 2023-09-28 ENCOUNTER — TELEPHONE (OUTPATIENT)
Facility: CLINIC | Age: 63
End: 2023-09-28

## 2023-09-28 DIAGNOSIS — J44.9 CHRONIC OBSTRUCTIVE PULMONARY DISEASE, UNSPECIFIED COPD TYPE (HCC): Primary | ICD-10-CM

## 2023-09-28 RX ORDER — FLUTICASONE FUROATE AND VILANTEROL 100; 25 UG/1; UG/1
1 POWDER RESPIRATORY (INHALATION) DAILY
Qty: 3 EACH | Refills: 1 | Status: SHIPPED | OUTPATIENT
Start: 2023-09-28

## 2023-09-28 NOTE — TELEPHONE ENCOUNTER
Pharmacy sent message, [facility] is not covered by insurance and they would like another medication sent. These are the covered.     Symbicort 160-4.5, breo ellipta 100-25, advair 250-50 diskus, advair hfa 115-21

## 2023-10-10 ENCOUNTER — HOSPITAL ENCOUNTER (OUTPATIENT)
Facility: HOSPITAL | Age: 63
Discharge: HOME | End: 2023-10-13
Attending: FAMILY MEDICINE
Payer: COMMERCIAL

## 2023-10-10 VITALS — HEIGHT: 64 IN | WEIGHT: 151 LBS | BODY MASS INDEX: 25.78 KG/M2

## 2023-10-10 DIAGNOSIS — F17.200 TOBACCO DEPENDENCE: ICD-10-CM

## 2023-10-10 PROCEDURE — 71271 CT THORAX LUNG CANCER SCR C-: CPT

## 2023-10-16 ENCOUNTER — HOSPITAL ENCOUNTER (EMERGENCY)
Facility: HOSPITAL | Age: 63
Discharge: HOME | End: 2023-10-16
Attending: STUDENT IN AN ORGANIZED HEALTH CARE EDUCATION/TRAINING PROGRAM
Payer: MEDICARE

## 2023-10-16 ENCOUNTER — APPOINTMENT (OUTPATIENT)
Facility: HOSPITAL | Age: 63
End: 2023-10-16
Payer: MEDICARE

## 2023-10-16 VITALS
RESPIRATION RATE: 16 BRPM | BODY MASS INDEX: 26.82 KG/M2 | OXYGEN SATURATION: 98 % | WEIGHT: 161 LBS | DIASTOLIC BLOOD PRESSURE: 93 MMHG | SYSTOLIC BLOOD PRESSURE: 174 MMHG | HEART RATE: 64 BPM | HEIGHT: 65 IN | TEMPERATURE: 98.8 F

## 2023-10-16 DIAGNOSIS — G89.29 ACUTE EXACERBATION OF CHRONIC LOW BACK PAIN: Primary | ICD-10-CM

## 2023-10-16 DIAGNOSIS — M25.552 LEFT HIP PAIN: ICD-10-CM

## 2023-10-16 DIAGNOSIS — M54.50 ACUTE EXACERBATION OF CHRONIC LOW BACK PAIN: Primary | ICD-10-CM

## 2023-10-16 PROCEDURE — 99283 EMERGENCY DEPT VISIT LOW MDM: CPT

## 2023-10-16 PROCEDURE — 73502 X-RAY EXAM HIP UNI 2-3 VIEWS: CPT

## 2023-10-16 PROCEDURE — 6370000000 HC RX 637 (ALT 250 FOR IP): Performed by: STUDENT IN AN ORGANIZED HEALTH CARE EDUCATION/TRAINING PROGRAM

## 2023-10-16 PROCEDURE — 72100 X-RAY EXAM L-S SPINE 2/3 VWS: CPT

## 2023-10-16 RX ORDER — METHOCARBAMOL 500 MG/1
750 TABLET, FILM COATED ORAL
Status: COMPLETED | OUTPATIENT
Start: 2023-10-16 | End: 2023-10-16

## 2023-10-16 RX ORDER — LIDOCAINE 50 MG/G
1 PATCH TOPICAL DAILY
Qty: 10 PATCH | Refills: 0 | Status: SHIPPED | OUTPATIENT
Start: 2023-10-16 | End: 2023-10-26

## 2023-10-16 RX ORDER — TIZANIDINE 4 MG/1
4 TABLET ORAL 3 TIMES DAILY PRN
Qty: 30 TABLET | Refills: 0 | Status: SHIPPED | OUTPATIENT
Start: 2023-10-16

## 2023-10-16 RX ORDER — PREDNISONE 20 MG/1
20 TABLET ORAL 2 TIMES DAILY
Qty: 10 TABLET | Refills: 0 | Status: SHIPPED | OUTPATIENT
Start: 2023-10-16 | End: 2023-10-21

## 2023-10-16 RX ORDER — LIDOCAINE 4 G/G
1 PATCH TOPICAL DAILY
Status: DISCONTINUED | OUTPATIENT
Start: 2023-10-16 | End: 2023-10-16 | Stop reason: HOSPADM

## 2023-10-16 RX ADMIN — METHOCARBAMOL TABLETS 750 MG: 500 TABLET, COATED ORAL at 10:02

## 2023-10-16 ASSESSMENT — PAIN DESCRIPTION - ORIENTATION
ORIENTATION: LEFT
ORIENTATION: LEFT

## 2023-10-16 ASSESSMENT — ENCOUNTER SYMPTOMS
ABDOMINAL PAIN: 0
VOMITING: 0
DIARRHEA: 0
NAUSEA: 0
BACK PAIN: 1
COUGH: 0
SORE THROAT: 0
EYE PAIN: 0
SHORTNESS OF BREATH: 0

## 2023-10-16 ASSESSMENT — PAIN - FUNCTIONAL ASSESSMENT: PAIN_FUNCTIONAL_ASSESSMENT: 0-10

## 2023-10-16 ASSESSMENT — PAIN SCALES - GENERAL
PAINLEVEL_OUTOF10: 5
PAINLEVEL_OUTOF10: 5

## 2023-10-16 ASSESSMENT — PAIN DESCRIPTION - DESCRIPTORS
DESCRIPTORS: STABBING
DESCRIPTORS: STABBING

## 2023-10-16 ASSESSMENT — PAIN DESCRIPTION - LOCATION
LOCATION: HIP
LOCATION: HIP

## 2023-10-16 ASSESSMENT — PAIN DESCRIPTION - PAIN TYPE: TYPE: ACUTE PAIN

## 2023-10-16 NOTE — ED TRIAGE NOTES
Pt arrives in no signs of distress ambulatory visibly limping. Pt reports lower back pain x1 week and left hip pain that started today. Pt reports having had bilatera hip replacements.  Pt had her left hip replaced in approx 2005

## 2023-10-16 NOTE — ED PROVIDER NOTES
as possible for a visit         DISCHARGE MEDICATIONS:  Discharge Medication List as of 10/16/2023 10:37 AM        START taking these medications    Details   lidocaine (LIDODERM) 5 % Place 1 patch onto the skin daily for 10 days 12 hours on, 12 hours off., Disp-10 patch, R-0Normal      tiZANidine (ZANAFLEX) 4 MG tablet Take 1 tablet by mouth 3 times daily as needed (pain), Disp-30 tablet, R-0Normal      predniSONE (DELTASONE) 20 MG tablet Take 1 tablet by mouth 2 times daily for 5 days, Disp-10 tablet, R-0Normal               (Please note that portions of this note were completed with a voice recognition program.  Efforts were made to edit the dictations but occasionally words are mis-transcribed.)    SASKIA Vickers (electronically signed)  Emergency Attending Physician / Physician Assistant / Nurse Practitioner              Laurence Dominguez, [address] [place] [address]  10/16/23 3491

## 2023-10-16 NOTE — ED NOTES
Pt was discharged and given instructions by MD. Pt verbalized good understanding of all discharge instructions,3 prescriptions and F/U care. All questions answered. Pt in stable condition on discharge.        Redd Garcia RN  10/16/23 9572

## 2023-11-15 ENCOUNTER — HOSPITAL ENCOUNTER (OUTPATIENT)
Facility: HOSPITAL | Age: 63
Discharge: HOME | End: 2023-11-18
Attending: ORTHOPAEDIC SURGERY
Payer: MEDICARE

## 2023-11-15 ENCOUNTER — HOSPITAL ENCOUNTER (OUTPATIENT)
Facility: HOSPITAL | Age: 63
End: 2023-11-15
Attending: ORTHOPAEDIC SURGERY
Payer: MEDICARE

## 2023-11-15 DIAGNOSIS — M25.552 CHRONIC HIP PAIN AFTER TOTAL REPLACEMENT OF LEFT HIP JOINT: ICD-10-CM

## 2023-11-15 DIAGNOSIS — Z96.642 CHRONIC HIP PAIN AFTER TOTAL REPLACEMENT OF LEFT HIP JOINT: ICD-10-CM

## 2023-11-15 DIAGNOSIS — G89.29 CHRONIC HIP PAIN AFTER TOTAL REPLACEMENT OF LEFT HIP JOINT: ICD-10-CM

## 2023-11-15 PROCEDURE — A9503 TC99M MEDRONATE: HCPCS | Performed by: ORTHOPAEDIC SURGERY

## 2023-11-15 PROCEDURE — 3430000000 HC RX DIAGNOSTIC RADIOPHARMACEUTICAL: Performed by: ORTHOPAEDIC SURGERY

## 2023-11-15 PROCEDURE — 78315 BONE IMAGING 3 PHASE: CPT

## 2023-11-15 RX ORDER — TC 99M MEDRONATE 20 MG/10ML
25 INJECTION, POWDER, LYOPHILIZED, FOR SOLUTION INTRAVENOUS
Status: COMPLETED | OUTPATIENT
Start: 2023-11-15 | End: 2023-11-15

## 2023-11-15 RX ADMIN — TC 99M MEDRONATE 25 MILLICURIE: 20 INJECTION, POWDER, LYOPHILIZED, FOR SOLUTION INTRAVENOUS at 11:05

## 2023-11-27 ENCOUNTER — APPOINTMENT (OUTPATIENT)
Facility: HOSPITAL | Age: 63
End: 2023-11-27
Payer: MEDICARE

## 2023-11-27 ENCOUNTER — HOSPITAL ENCOUNTER (EMERGENCY)
Facility: HOSPITAL | Age: 63
Discharge: HOME | End: 2023-11-28
Attending: EMERGENCY MEDICINE
Payer: MEDICARE

## 2023-11-27 VITALS
WEIGHT: 161 LBS | SYSTOLIC BLOOD PRESSURE: 96 MMHG | OXYGEN SATURATION: 92 % | HEART RATE: 73 BPM | TEMPERATURE: 98.8 F | DIASTOLIC BLOOD PRESSURE: 62 MMHG | HEIGHT: 64 IN | RESPIRATION RATE: 20 BRPM | BODY MASS INDEX: 27.49 KG/M2

## 2023-11-27 DIAGNOSIS — J18.9 PNEUMONIA OF RIGHT UPPER LOBE DUE TO INFECTIOUS ORGANISM: Primary | ICD-10-CM

## 2023-11-27 LAB
ALBUMIN SERPL-MCNC: 3.1 G/DL (ref 3.5–5)
ALBUMIN/GLOB SERPL: 0.8 (ref 1.1–2.2)
ALP SERPL-CCNC: 66 U/L (ref 45–117)
ALT SERPL-CCNC: 13 U/L (ref 12–78)
ANION GAP SERPL CALC-SCNC: 5 MMOL/L (ref 5–15)
AST SERPL-CCNC: 10 U/L (ref 15–37)
BASOPHILS # BLD: 0 K/UL (ref 0–0.1)
BASOPHILS NFR BLD: 0 % (ref 0–1)
BILIRUB SERPL-MCNC: 0.5 MG/DL (ref 0.2–1)
BUN SERPL-MCNC: 9 MG/DL (ref 6–20)
BUN/CREAT SERPL: 10 (ref 12–20)
CALCIUM SERPL-MCNC: 8.7 MG/DL (ref 8.5–10.1)
CHLORIDE SERPL-SCNC: 99 MMOL/L (ref 97–108)
CO2 SERPL-SCNC: 27 MMOL/L (ref 21–32)
CREAT SERPL-MCNC: 0.87 MG/DL (ref 0.55–1.02)
DIFFERENTIAL METHOD BLD: ABNORMAL
EOSINOPHIL # BLD: 0.1 K/UL (ref 0–0.4)
EOSINOPHIL NFR BLD: 1 % (ref 0–7)
ERYTHROCYTE [DISTWIDTH] IN BLOOD BY AUTOMATED COUNT: 13.1 % (ref 11.5–14.5)
GLOBULIN SER CALC-MCNC: 4.1 G/DL (ref 2–4)
GLUCOSE SERPL-MCNC: 116 MG/DL (ref 65–100)
HCT VFR BLD AUTO: 41.2 % (ref 35–47)
HGB BLD-MCNC: 14 G/DL (ref 11.5–16)
IMM GRANULOCYTES # BLD AUTO: 0.1 K/UL (ref 0–0.04)
IMM GRANULOCYTES NFR BLD AUTO: 1 % (ref 0–0.5)
LYMPHOCYTES # BLD: 3.2 K/UL (ref 0.8–3.5)
LYMPHOCYTES NFR BLD: 24 % (ref 12–49)
MCH RBC QN AUTO: 33.7 PG (ref 26–34)
MCHC RBC AUTO-ENTMCNC: 34 G/DL (ref 30–36.5)
MCV RBC AUTO: 99 FL (ref 80–99)
MONOCYTES # BLD: 1.2 K/UL (ref 0–1)
MONOCYTES NFR BLD: 9 % (ref 5–13)
NEUTS SEG # BLD: 8.6 K/UL (ref 1.8–8)
NEUTS SEG NFR BLD: 65 % (ref 32–75)
NRBC # BLD: 0 K/UL (ref 0–0.01)
NRBC BLD-RTO: 0 PER 100 WBC
PLATELET # BLD AUTO: 251 K/UL (ref 150–400)
PMV BLD AUTO: 9.1 FL (ref 8.9–12.9)
POTASSIUM SERPL-SCNC: 3.4 MMOL/L (ref 3.5–5.1)
PROT SERPL-MCNC: 7.2 G/DL (ref 6.4–8.2)
RBC # BLD AUTO: 4.16 M/UL (ref 3.8–5.2)
SODIUM SERPL-SCNC: 131 MMOL/L (ref 136–145)
WBC # BLD AUTO: 13.1 K/UL (ref 3.6–11)

## 2023-11-27 PROCEDURE — 2580000003 HC RX 258: Performed by: EMERGENCY MEDICINE

## 2023-11-27 PROCEDURE — 96375 TX/PRO/DX INJ NEW DRUG ADDON: CPT

## 2023-11-27 PROCEDURE — 36415 COLL VENOUS BLD VENIPUNCTURE: CPT

## 2023-11-27 PROCEDURE — 80053 COMPREHEN METABOLIC PANEL: CPT

## 2023-11-27 PROCEDURE — 85025 COMPLETE CBC W/AUTO DIFF WBC: CPT

## 2023-11-27 PROCEDURE — 71046 X-RAY EXAM CHEST 2 VIEWS: CPT

## 2023-11-27 PROCEDURE — 96365 THER/PROPH/DIAG IV INF INIT: CPT

## 2023-11-27 PROCEDURE — 6360000002 HC RX W HCPCS: Performed by: EMERGENCY MEDICINE

## 2023-11-27 PROCEDURE — 99284 EMERGENCY DEPT VISIT MOD MDM: CPT

## 2023-11-27 RX ORDER — AMOXICILLIN 875 MG/1
875 TABLET, COATED ORAL 2 TIMES DAILY
Qty: 20 TABLET | Refills: 0 | Status: SHIPPED | OUTPATIENT
Start: 2023-11-27 | End: 2023-12-07

## 2023-11-27 RX ORDER — AZITHROMYCIN 250 MG/1
250 TABLET, FILM COATED ORAL SEE ADMIN INSTRUCTIONS
Qty: 6 TABLET | Refills: 0 | Status: SHIPPED | OUTPATIENT
Start: 2023-11-27 | End: 2023-12-02

## 2023-11-27 RX ADMIN — WATER 1000 MG: 1 INJECTION INTRAMUSCULAR; INTRAVENOUS; SUBCUTANEOUS at 23:31

## 2023-11-27 RX ADMIN — AZITHROMYCIN MONOHYDRATE 500 MG: 500 INJECTION, POWDER, LYOPHILIZED, FOR SOLUTION INTRAVENOUS at 23:31

## 2023-11-27 ASSESSMENT — PAIN DESCRIPTION - LOCATION: LOCATION: GENERALIZED

## 2023-11-27 ASSESSMENT — PAIN SCALES - GENERAL: PAINLEVEL_OUTOF10: 5

## 2023-11-27 ASSESSMENT — PAIN - FUNCTIONAL ASSESSMENT: PAIN_FUNCTIONAL_ASSESSMENT: 0-10

## 2023-11-28 NOTE — ED PROVIDER NOTES
[facility] EMERGENCY DEPT  EMERGENCY DEPARTMENT ENCOUNTER      Pt Name: Angie Wylie  MRN: [id number]  [address] [place] 1960  Date of evaluation: 2023  Provider: Beulah Falcon MD      HISTORY OF PRESENT ILLNESS      73-year-old female history of anxiety, COPD, hypertension, chronic lower back pain per chart review presents to the emergency department chief plaint shortness of breath and dizziness. She reports cough for about 1 week without fevers. Multiple sick contacts with cough. She reports history of COPD and current smoking. Her primary care recently changed one of her COPD medications but she feels that is not working as well as the old medicine was. She also reports some lower back pain without dysuria. The history is provided by the patient and medical records. Nursing Notes were reviewed.     REVIEW OF SYSTEMS         Review of Systems        PAST MEDICAL HISTORY     Past Medical History:   Diagnosis Date    Anxiety and depression     COPD (chronic obstructive pulmonary disease) ([address])     COPD exacerbation ([address]) 10/2022    High cholesterol     Hypertension          SURGICAL HISTORY       Past Surgical History:   Procedure Laterality Date     SECTION      x 2    COLONOSCOPY  2012    repeat in 5 years    HYSTERECTOMY (CERVIX STATUS UNKNOWN)      ORTHOPEDIC SURGERY Left     release of ulnar nerve     OVARY REMOVAL      TOTAL HIP ARTHROPLASTY Left     TOTAL HIP ARTHROPLASTY Right          CURRENT MEDICATIONS       Previous Medications    ALBUTEROL (PROVENTIL) (2.5 MG/3ML) 0.083% NEBULIZER SOLUTION    Inhale 3 mLs into the lungs every 4 hours as needed    ALBUTEROL SULFATE HFA (PROVENTIL;VENTOLIN;PROAIR) 108 (90 BASE) MCG/ACT INHALER    Inhale 2 puffs into the lungs every 4 hours as needed for Wheezing    ASPIRIN-ACETAMINOPHEN-CAFFEINE (EXCEDRIN MIGRAINE) 250-250-65 MG PER TABLET    Take 3 tablets by mouth daily    ESCITALOPRAM (LEXAPRO) 20 MG TABLET    Take 1 tablet by mouth

## 2023-11-28 NOTE — ED TRIAGE NOTES
Patient states has had approximately a week of cough deep in her chest with shortness of breath. Temperature today to 99.4. Has generalized body aches. History of COPD,  uses inhalers. Some nausea intermittently, no vomiting. States is able to drink and keep fluids down. Feeling very fatigued.

## 2023-12-01 ENCOUNTER — PATIENT MESSAGE (OUTPATIENT)
Facility: CLINIC | Age: 63
End: 2023-12-01

## 2023-12-01 NOTE — TELEPHONE ENCOUNTER
LMTCB x1 2023 1048      From: Oren Pizano  To: Dr. Rene Espinoza: 2023  5:50 PM EST  Subject: Appointment Request    Appointment Request From: Oren Pizano    With Provider: Toma Mack MD [[address]    Preferred Date Range: Any    Preferred Times: Any Time    Reason for visit: Request an Appointment    Comments:   Follow-Up's from orthopedics and follow up from going to ER with pneumonia

## 2023-12-05 ENCOUNTER — HOSPITAL ENCOUNTER (OUTPATIENT)
Facility: HOSPITAL | Age: 63
Discharge: HOME | End: 2023-12-08
Attending: STUDENT IN AN ORGANIZED HEALTH CARE EDUCATION/TRAINING PROGRAM
Payer: MEDICARE

## 2023-12-05 ENCOUNTER — TRANSCRIBE ORDERS (OUTPATIENT)
Facility: HOSPITAL | Age: 63
End: 2023-12-05

## 2023-12-05 DIAGNOSIS — Z12.31 OTHER SCREENING MAMMOGRAM: Primary | ICD-10-CM

## 2023-12-05 DIAGNOSIS — Z12.31 OTHER SCREENING MAMMOGRAM: ICD-10-CM

## 2023-12-05 DIAGNOSIS — M54.50 LUMBAR PAIN: ICD-10-CM

## 2023-12-05 PROCEDURE — 72148 MRI LUMBAR SPINE W/O DYE: CPT

## 2023-12-05 PROCEDURE — 77067 SCR MAMMO BI INCL CAD: CPT

## 2024-01-02 ENCOUNTER — TELEPHONE (OUTPATIENT)
Facility: CLINIC | Age: 64
End: 2024-01-02

## 2024-01-02 DIAGNOSIS — J44.1 CHRONIC OBSTRUCTIVE PULMONARY DISEASE WITH ACUTE EXACERBATION (HCC): ICD-10-CM

## 2024-01-02 NOTE — TELEPHONE ENCOUNTER
Pt called in regard to her Breo inhaler that she's been taking. Pt stated that it causes more congestion. Pt stated that inhaler has replaced her Spiriva due to her insurance not covering it. Pt stated that has worked really well for her and would like to get her COPD medications back on track

## 2024-01-04 NOTE — TELEPHONE ENCOUNTER
I saw her 3 months ago for a COPD exacerbation and refilled her current medications at that time - Dulera and Spiriva. Her insurance denied the Dulera saying she would need to try a covered alternative first so we sent in Breo which was on their list. If Breo has not been working as well, will send in new rx for Spiriva and Dulera again and can attempt a PA since the covered alternative did not work.

## 2024-01-04 NOTE — TELEPHONE ENCOUNTER
Spoke with and she stated that Breo is not working she is getting more mucus. Please call in one of the others

## 2024-01-08 NOTE — TELEPHONE ENCOUNTER
1/4/24:   Outcome  Additional Information Required  Your PA has been resolved, no additional PA is required. For further inquiries please contact the number on the back of the member prescription card. (Message [phone])  Drug  Dulera 100-5MCG/ACT aerosol

## 2024-02-01 ENCOUNTER — HOSPITAL ENCOUNTER (INPATIENT)
Facility: HOSPITAL | Age: 64
LOS: 3 days | Discharge: HOME | DRG: 189 | End: 2024-02-04
Attending: EMERGENCY MEDICINE | Admitting: HOSPITALIST
Payer: MEDICARE

## 2024-02-01 ENCOUNTER — APPOINTMENT (OUTPATIENT)
Facility: HOSPITAL | Age: 64
DRG: 189 | End: 2024-02-01
Payer: MEDICARE

## 2024-02-01 DIAGNOSIS — J11.1 INFLUENZA: ICD-10-CM

## 2024-02-01 DIAGNOSIS — E87.1 HYPONATREMIA: ICD-10-CM

## 2024-02-01 DIAGNOSIS — J96.01 ACUTE RESPIRATORY FAILURE WITH HYPOXIA (HCC): Primary | ICD-10-CM

## 2024-02-01 PROBLEM — J44.9 COPD (CHRONIC OBSTRUCTIVE PULMONARY DISEASE) (HCC): Status: ACTIVE | Noted: 2024-02-01

## 2024-02-01 LAB
ALBUMIN SERPL-MCNC: 4.4 G/DL (ref 3.5–5.2)
ALBUMIN/GLOB SERPL: 1.4 (ref 1.1–2.2)
ALP SERPL-CCNC: 65 U/L (ref 35–104)
ALT SERPL-CCNC: 18 U/L (ref 10–35)
ANION GAP SERPL CALC-SCNC: 14 MMOL/L (ref 5–15)
AST SERPL-CCNC: 34 U/L (ref 10–35)
BASE EXCESS BLD CALC-SCNC: 0.2 MMOL/L
BASOPHILS # BLD: 0 K/UL (ref 0–0.1)
BASOPHILS NFR BLD: 0 % (ref 0–1)
BILIRUB SERPL-MCNC: 0.2 MG/DL (ref 0.2–1)
BUN SERPL-MCNC: 18 MG/DL (ref 8–23)
BUN/CREAT SERPL: 22 (ref 12–20)
CALCIUM SERPL-MCNC: 8.9 MG/DL (ref 8.8–10.2)
CHLORIDE SERPL-SCNC: 86 MMOL/L (ref 98–107)
CO2 SERPL-SCNC: 27 MMOL/L (ref 22–29)
COMMENT:: NORMAL
CREAT SERPL-MCNC: 0.81 MG/DL (ref 0.5–0.9)
DIFFERENTIAL METHOD BLD: ABNORMAL
EKG ATRIAL RATE: 76 BPM
EKG DIAGNOSIS: NORMAL
EKG P AXIS: 74 DEGREES
EKG P-R INTERVAL: 140 MS
EKG Q-T INTERVAL: 426 MS
EKG QRS DURATION: 76 MS
EKG QTC CALCULATION (BAZETT): 479 MS
EKG R AXIS: 83 DEGREES
EKG T AXIS: 75 DEGREES
EKG VENTRICULAR RATE: 76 BPM
EOSINOPHIL # BLD: 0.1 K/UL (ref 0–0.4)
EOSINOPHIL NFR BLD: 1 % (ref 0–7)
ERYTHROCYTE [DISTWIDTH] IN BLOOD BY AUTOMATED COUNT: 12.6 % (ref 11.5–14.5)
FLUAV RNA SPEC QL NAA+PROBE: DETECTED
FLUBV RNA SPEC QL NAA+PROBE: NOT DETECTED
GLOBULIN SER CALC-MCNC: 3.2 G/DL (ref 2–4)
GLUCOSE SERPL-MCNC: 93 MG/DL (ref 65–100)
HCO3 BLD-SCNC: 27.6 MMOL/L (ref 22–26)
HCT VFR BLD AUTO: 43.8 % (ref 35–47)
HGB BLD-MCNC: 14.6 G/DL (ref 11.5–16)
IMM GRANULOCYTES # BLD AUTO: 0 K/UL
IMM GRANULOCYTES NFR BLD AUTO: 0 %
LACTATE BLD-SCNC: 1.14 MMOL/L (ref 0.4–2)
LYMPHOCYTES # BLD: 0.5 K/UL (ref 0.8–3.5)
LYMPHOCYTES NFR BLD: 9 % (ref 12–49)
MCH RBC QN AUTO: 32 PG (ref 26–34)
MCHC RBC AUTO-ENTMCNC: 33.3 G/DL (ref 30–36.5)
MCV RBC AUTO: 96.1 FL (ref 80–99)
MONOCYTES # BLD: 0.5 K/UL (ref 0–1)
MONOCYTES NFR BLD: 10 % (ref 5–13)
NEUTS BAND NFR BLD MANUAL: 8 % (ref 0–6)
NEUTS SEG # BLD: 4.3 K/UL (ref 1.8–8)
NEUTS SEG NFR BLD: 72 % (ref 32–75)
NRBC # BLD: 0 K/UL (ref 0–0.01)
NRBC BLD-RTO: 0 PER 100 WBC
NT PRO BNP: 236 PG/ML
PCO2 BLD: 52.9 MMHG (ref 35–45)
PH BLD: 7.33 (ref 7.35–7.45)
PLATELET # BLD AUTO: 171 K/UL (ref 150–400)
PMV BLD AUTO: 9.5 FL (ref 8.9–12.9)
PO2 BLD: <27 MMHG (ref 80–100)
POTASSIUM SERPL-SCNC: 3.5 MMOL/L (ref 3.5–5.1)
PROT SERPL-MCNC: 7.6 G/DL (ref 6.4–8.3)
RBC # BLD AUTO: 4.56 M/UL (ref 3.8–5.2)
RBC MORPH BLD: ABNORMAL
SARS-COV-2 RNA RESP QL NAA+PROBE: NOT DETECTED
SERVICE CMNT-IMP: ABNORMAL
SODIUM SERPL-SCNC: 127 MMOL/L (ref 136–145)
SPECIMEN HOLD: NORMAL
SPECIMEN TYPE: ABNORMAL
TROPONIN I BLD-MCNC: <0.04 NG/ML (ref 0–0.08)
WBC # BLD AUTO: 5.4 K/UL (ref 3.6–11)

## 2024-02-01 PROCEDURE — 84484 ASSAY OF TROPONIN QUANT: CPT

## 2024-02-01 PROCEDURE — 80053 COMPREHEN METABOLIC PANEL: CPT

## 2024-02-01 PROCEDURE — 2580000003 HC RX 258: Performed by: EMERGENCY MEDICINE

## 2024-02-01 PROCEDURE — 93005 ELECTROCARDIOGRAM TRACING: CPT | Performed by: EMERGENCY MEDICINE

## 2024-02-01 PROCEDURE — 36415 COLL VENOUS BLD VENIPUNCTURE: CPT

## 2024-02-01 PROCEDURE — 83880 ASSAY OF NATRIURETIC PEPTIDE: CPT

## 2024-02-01 PROCEDURE — 87636 SARSCOV2 & INF A&B AMP PRB: CPT

## 2024-02-01 PROCEDURE — 2580000003 HC RX 258: Performed by: HOSPITALIST

## 2024-02-01 PROCEDURE — 83605 ASSAY OF LACTIC ACID: CPT

## 2024-02-01 PROCEDURE — 85025 COMPLETE CBC W/AUTO DIFF WBC: CPT

## 2024-02-01 PROCEDURE — 82803 BLOOD GASES ANY COMBINATION: CPT

## 2024-02-01 PROCEDURE — 93010 ELECTROCARDIOGRAM REPORT: CPT | Performed by: SPECIALIST

## 2024-02-01 PROCEDURE — 87154 CUL TYP ID BLD PTHGN 6+ TRGT: CPT

## 2024-02-01 PROCEDURE — 6370000000 HC RX 637 (ALT 250 FOR IP): Performed by: EMERGENCY MEDICINE

## 2024-02-01 PROCEDURE — 1100000000 HC RM PRIVATE

## 2024-02-01 PROCEDURE — 71250 CT THORAX DX C-: CPT

## 2024-02-01 PROCEDURE — 6360000002 HC RX W HCPCS: Performed by: HOSPITALIST

## 2024-02-01 PROCEDURE — 6360000002 HC RX W HCPCS: Performed by: EMERGENCY MEDICINE

## 2024-02-01 PROCEDURE — 6370000000 HC RX 637 (ALT 250 FOR IP): Performed by: HOSPITALIST

## 2024-02-01 PROCEDURE — 99285 EMERGENCY DEPT VISIT HI MDM: CPT

## 2024-02-01 PROCEDURE — 71045 X-RAY EXAM CHEST 1 VIEW: CPT

## 2024-02-01 PROCEDURE — 87040 BLOOD CULTURE FOR BACTERIA: CPT

## 2024-02-01 RX ORDER — 0.9 % SODIUM CHLORIDE 0.9 %
1000 INTRAVENOUS SOLUTION INTRAVENOUS ONCE
Status: COMPLETED | OUTPATIENT
Start: 2024-02-01 | End: 2024-02-01

## 2024-02-01 RX ORDER — BENZONATATE 100 MG/1
100 CAPSULE ORAL 3 TIMES DAILY PRN
Status: DISCONTINUED | OUTPATIENT
Start: 2024-02-01 | End: 2024-02-04 | Stop reason: HOSPADM

## 2024-02-01 RX ORDER — SODIUM CHLORIDE 9 MG/ML
INJECTION, SOLUTION INTRAVENOUS PRN
Status: DISCONTINUED | OUTPATIENT
Start: 2024-02-01 | End: 2024-02-04 | Stop reason: HOSPADM

## 2024-02-01 RX ORDER — ONDANSETRON 2 MG/ML
4 INJECTION INTRAMUSCULAR; INTRAVENOUS EVERY 6 HOURS PRN
Status: DISCONTINUED | OUTPATIENT
Start: 2024-02-01 | End: 2024-02-04 | Stop reason: HOSPADM

## 2024-02-01 RX ORDER — DICLOFENAC SODIUM 75 MG/1
75 TABLET, DELAYED RELEASE ORAL PRN
COMMUNITY

## 2024-02-01 RX ORDER — GUAIFENESIN/DEXTROMETHORPHAN 100-10MG/5
5 SYRUP ORAL EVERY 4 HOURS PRN
Status: DISCONTINUED | OUTPATIENT
Start: 2024-02-01 | End: 2024-02-04 | Stop reason: HOSPADM

## 2024-02-01 RX ORDER — PREDNISONE 20 MG/1
20 TABLET ORAL DAILY
Status: DISCONTINUED | OUTPATIENT
Start: 2024-02-02 | End: 2024-02-02

## 2024-02-01 RX ORDER — OSELTAMIVIR PHOSPHATE 75 MG/1
75 CAPSULE ORAL 2 TIMES DAILY
Status: DISCONTINUED | OUTPATIENT
Start: 2024-02-02 | End: 2024-02-04 | Stop reason: HOSPADM

## 2024-02-01 RX ORDER — GABAPENTIN 300 MG/1
300 CAPSULE ORAL DAILY
Status: DISCONTINUED | OUTPATIENT
Start: 2024-02-01 | End: 2024-02-04 | Stop reason: HOSPADM

## 2024-02-01 RX ORDER — OSELTAMIVIR PHOSPHATE 6 MG/ML
75 FOR SUSPENSION ORAL 2 TIMES DAILY
Status: DISCONTINUED | OUTPATIENT
Start: 2024-02-01 | End: 2024-02-01

## 2024-02-01 RX ORDER — TIZANIDINE 2 MG/1
4 TABLET ORAL 3 TIMES DAILY PRN
Status: DISCONTINUED | OUTPATIENT
Start: 2024-02-01 | End: 2024-02-04 | Stop reason: HOSPADM

## 2024-02-01 RX ORDER — MAGNESIUM SULFATE IN WATER 40 MG/ML
2000 INJECTION, SOLUTION INTRAVENOUS PRN
Status: DISCONTINUED | OUTPATIENT
Start: 2024-02-01 | End: 2024-02-04 | Stop reason: HOSPADM

## 2024-02-01 RX ORDER — SODIUM CHLORIDE 0.9 % (FLUSH) 0.9 %
5-40 SYRINGE (ML) INJECTION PRN
Status: DISCONTINUED | OUTPATIENT
Start: 2024-02-01 | End: 2024-02-04 | Stop reason: HOSPADM

## 2024-02-01 RX ORDER — ONDANSETRON 4 MG/1
4 TABLET, ORALLY DISINTEGRATING ORAL EVERY 8 HOURS PRN
Status: DISCONTINUED | OUTPATIENT
Start: 2024-02-01 | End: 2024-02-04 | Stop reason: HOSPADM

## 2024-02-01 RX ORDER — SODIUM CHLORIDE 0.9 % (FLUSH) 0.9 %
5-40 SYRINGE (ML) INJECTION EVERY 12 HOURS SCHEDULED
Status: DISCONTINUED | OUTPATIENT
Start: 2024-02-01 | End: 2024-02-04 | Stop reason: HOSPADM

## 2024-02-01 RX ORDER — ESCITALOPRAM OXALATE 10 MG/1
20 TABLET ORAL DAILY
Status: DISCONTINUED | OUTPATIENT
Start: 2024-02-01 | End: 2024-02-04 | Stop reason: HOSPADM

## 2024-02-01 RX ORDER — GABAPENTIN 300 MG/1
300 CAPSULE ORAL DAILY
COMMUNITY

## 2024-02-01 RX ORDER — ACETAMINOPHEN 650 MG/1
650 SUPPOSITORY RECTAL EVERY 6 HOURS PRN
Status: DISCONTINUED | OUTPATIENT
Start: 2024-02-01 | End: 2024-02-04 | Stop reason: HOSPADM

## 2024-02-01 RX ORDER — OSELTAMIVIR PHOSPHATE 75 MG/1
75 CAPSULE ORAL
Status: COMPLETED | OUTPATIENT
Start: 2024-02-01 | End: 2024-02-01

## 2024-02-01 RX ORDER — POLYETHYLENE GLYCOL 3350 17 G/17G
17 POWDER, FOR SOLUTION ORAL DAILY PRN
Status: DISCONTINUED | OUTPATIENT
Start: 2024-02-01 | End: 2024-02-04 | Stop reason: HOSPADM

## 2024-02-01 RX ORDER — PANTOPRAZOLE SODIUM 40 MG/1
40 TABLET, DELAYED RELEASE ORAL EVERY MORNING
Status: DISCONTINUED | OUTPATIENT
Start: 2024-02-02 | End: 2024-02-04 | Stop reason: HOSPADM

## 2024-02-01 RX ORDER — POTASSIUM CHLORIDE 7.45 MG/ML
10 INJECTION INTRAVENOUS PRN
Status: DISCONTINUED | OUTPATIENT
Start: 2024-02-01 | End: 2024-02-04 | Stop reason: HOSPADM

## 2024-02-01 RX ORDER — POTASSIUM CHLORIDE 750 MG/1
40 TABLET, FILM COATED, EXTENDED RELEASE ORAL PRN
Status: DISCONTINUED | OUTPATIENT
Start: 2024-02-01 | End: 2024-02-04 | Stop reason: HOSPADM

## 2024-02-01 RX ORDER — IPRATROPIUM BROMIDE AND ALBUTEROL SULFATE 2.5; .5 MG/3ML; MG/3ML
1 SOLUTION RESPIRATORY (INHALATION)
Status: DISCONTINUED | OUTPATIENT
Start: 2024-02-02 | End: 2024-02-04

## 2024-02-01 RX ORDER — IPRATROPIUM BROMIDE AND ALBUTEROL SULFATE 2.5; .5 MG/3ML; MG/3ML
1 SOLUTION RESPIRATORY (INHALATION) EVERY 20 MIN
Status: COMPLETED | OUTPATIENT
Start: 2024-02-01 | End: 2024-02-01

## 2024-02-01 RX ORDER — ACETAMINOPHEN 325 MG/1
650 TABLET ORAL EVERY 6 HOURS PRN
Status: DISCONTINUED | OUTPATIENT
Start: 2024-02-01 | End: 2024-02-04 | Stop reason: HOSPADM

## 2024-02-01 RX ORDER — BUTALBITAL, ACETAMINOPHEN AND CAFFEINE 50; 325; 40 MG/1; MG/1; MG/1
1 TABLET ORAL EVERY 4 HOURS PRN
Status: DISCONTINUED | OUTPATIENT
Start: 2024-02-01 | End: 2024-02-04 | Stop reason: HOSPADM

## 2024-02-01 RX ORDER — ENOXAPARIN SODIUM 100 MG/ML
40 INJECTION SUBCUTANEOUS EVERY 24 HOURS
Status: DISCONTINUED | OUTPATIENT
Start: 2024-02-01 | End: 2024-02-04 | Stop reason: HOSPADM

## 2024-02-01 RX ADMIN — IPRATROPIUM BROMIDE AND ALBUTEROL SULFATE 1 DOSE: .5; 3 SOLUTION RESPIRATORY (INHALATION) at 16:44

## 2024-02-01 RX ADMIN — IPRATROPIUM BROMIDE AND ALBUTEROL SULFATE 1 DOSE: .5; 3 SOLUTION RESPIRATORY (INHALATION) at 16:53

## 2024-02-01 RX ADMIN — SODIUM CHLORIDE, PRESERVATIVE FREE 10 ML: 5 INJECTION INTRAVENOUS at 22:06

## 2024-02-01 RX ADMIN — IPRATROPIUM BROMIDE AND ALBUTEROL SULFATE 1 DOSE: .5; 3 SOLUTION RESPIRATORY (INHALATION) at 17:17

## 2024-02-01 RX ADMIN — SODIUM CHLORIDE 1000 ML: 9 INJECTION, SOLUTION INTRAVENOUS at 16:34

## 2024-02-01 RX ADMIN — GABAPENTIN 300 MG: 300 CAPSULE ORAL at 22:06

## 2024-02-01 RX ADMIN — AZITHROMYCIN DIHYDRATE 500 MG: 500 INJECTION, POWDER, LYOPHILIZED, FOR SOLUTION INTRAVENOUS at 19:26

## 2024-02-01 RX ADMIN — WATER 1000 MG: 1 INJECTION INTRAMUSCULAR; INTRAVENOUS; SUBCUTANEOUS at 19:20

## 2024-02-01 RX ADMIN — ACETAMINOPHEN 650 MG: 325 TABLET ORAL at 23:51

## 2024-02-01 RX ADMIN — OSELTAMIVIR PHOSPHATE 75 MG: 75 CAPSULE ORAL at 17:20

## 2024-02-01 RX ADMIN — PREDNISONE 50 MG: 20 TABLET ORAL at 16:42

## 2024-02-01 ASSESSMENT — LIFESTYLE VARIABLES
HOW MANY STANDARD DRINKS CONTAINING ALCOHOL DO YOU HAVE ON A TYPICAL DAY: 1 OR 2
HOW OFTEN DO YOU HAVE A DRINK CONTAINING ALCOHOL: MONTHLY OR LESS

## 2024-02-01 ASSESSMENT — PAIN SCALES - GENERAL: PAINLEVEL_OUTOF10: 5

## 2024-02-01 NOTE — ED PROVIDER NOTES
[facility] EMERGENCY DEPT  EMERGENCY DEPARTMENT ENCOUNTER      Pt Name: Karyna Basilio  MRN: [id number]  Birthdate 1960  Date of evaluation: 2024  Provider: Eddie Connolly MD      HISTORY OF PRESENT ILLNESS      [facility]  63-year-old female with a past medical history of hyperlipidemia, hypertension, and COPD not on oxygen presenting to the emergency department due to cough, body aches, and shortness of breath.  She developed symptoms on Monday.  She says when she was at work she was dizzy and lightheaded.  Cough has been productive.  No fevers or chills.  Apparently her grandson was sick last week and she was exposed to what ever illness he had.  She has been using her rescue inhaler with no relief      Nursing Notes were reviewed.    REVIEW OF SYSTEMS         Review of Systems  A complete review of systems was performed and all systems reviewed are negative unless otherwise document in the [facility]      PAST MEDICAL HISTORY     Past Medical History:   Diagnosis Date    Anxiety and depression     COPD (chronic obstructive pulmonary disease) (HCC)     COPD exacerbation (HCC) 10/2022    High cholesterol     Hypertension          SURGICAL HISTORY       Past Surgical History:   Procedure Laterality Date     SECTION      x 2    COLONOSCOPY  2012    repeat in 5 years    HYSTERECTOMY (CERVIX STATUS UNKNOWN)      ORTHOPEDIC SURGERY Left     release of ulnar nerve     OVARY REMOVAL      TOTAL HIP ARTHROPLASTY Left     TOTAL HIP ARTHROPLASTY Right          CURRENT MEDICATIONS       Previous Medications    ALBUTEROL (PROVENTIL) (2.5 MG/3ML) 0.083% NEBULIZER SOLUTION    Inhale 3 mLs into the lungs every 4 hours as needed    ALBUTEROL SULFATE HFA (PROVENTIL;VENTOLIN;PROAIR) 108 (90 BASE) MCG/ACT INHALER    Inhale 2 puffs into the lungs every 4 hours as needed for Wheezing    ASPIRIN-ACETAMINOPHEN-CAFFEINE (EXCEDRIN MIGRAINE) 250-250-65 MG PER TABLET    Take 3 tablets by mouth daily    ESCITALOPRAM (LEXAPRO) 20 MG

## 2024-02-01 NOTE — ED TRIAGE NOTES
Pt arrives to ER with c/o cough, body aches, dizziness when standing up at work, symptoms started on Monday. Pt reports her family had COVID. Hx of COPD, initial O2 stats 85% on RA, patient placed on 4L NC, O2 stats 96%. Pt reports she used her inhaler today, but her rescue inhaler is out.

## 2024-02-02 LAB
ANION GAP SERPL CALC-SCNC: 5 MMOL/L (ref 5–15)
BASOPHILS # BLD: 0 K/UL (ref 0–0.1)
BASOPHILS NFR BLD: 0 % (ref 0–1)
BUN SERPL-MCNC: 14 MG/DL (ref 6–20)
BUN/CREAT SERPL: 22 (ref 12–20)
CALCIUM SERPL-MCNC: 8.7 MG/DL (ref 8.5–10.1)
CHLORIDE SERPL-SCNC: 103 MMOL/L (ref 97–108)
CO2 SERPL-SCNC: 31 MMOL/L (ref 21–32)
CREAT SERPL-MCNC: 0.64 MG/DL (ref 0.55–1.02)
DIFFERENTIAL METHOD BLD: ABNORMAL
EOSINOPHIL # BLD: 0 K/UL (ref 0–0.4)
EOSINOPHIL NFR BLD: 0 % (ref 0–7)
ERYTHROCYTE [DISTWIDTH] IN BLOOD BY AUTOMATED COUNT: 12.4 % (ref 11.5–14.5)
GLUCOSE SERPL-MCNC: 143 MG/DL (ref 65–100)
HCT VFR BLD AUTO: 41.9 % (ref 35–47)
HGB BLD-MCNC: 14 G/DL (ref 11.5–16)
IMM GRANULOCYTES # BLD AUTO: 0 K/UL (ref 0–0.04)
IMM GRANULOCYTES NFR BLD AUTO: 1 % (ref 0–0.5)
LYMPHOCYTES # BLD: 0.7 K/UL (ref 0.8–3.5)
LYMPHOCYTES NFR BLD: 17 % (ref 12–49)
MCH RBC QN AUTO: 32.3 PG (ref 26–34)
MCHC RBC AUTO-ENTMCNC: 33.4 G/DL (ref 30–36.5)
MCV RBC AUTO: 96.5 FL (ref 80–99)
MONOCYTES # BLD: 0.3 K/UL (ref 0–1)
MONOCYTES NFR BLD: 8 % (ref 5–13)
NEUTS SEG # BLD: 3.3 K/UL (ref 1.8–8)
NEUTS SEG NFR BLD: 74 % (ref 32–75)
NRBC # BLD: 0 K/UL (ref 0–0.01)
NRBC BLD-RTO: 0 PER 100 WBC
PLATELET # BLD AUTO: 166 K/UL (ref 150–400)
PMV BLD AUTO: 9.7 FL (ref 8.9–12.9)
POTASSIUM SERPL-SCNC: 3.8 MMOL/L (ref 3.5–5.1)
RBC # BLD AUTO: 4.34 M/UL (ref 3.8–5.2)
RBC MORPH BLD: ABNORMAL
SODIUM SERPL-SCNC: 139 MMOL/L (ref 136–145)
WBC # BLD AUTO: 4.3 K/UL (ref 3.6–11)

## 2024-02-02 PROCEDURE — 85025 COMPLETE CBC W/AUTO DIFF WBC: CPT

## 2024-02-02 PROCEDURE — 2580000003 HC RX 258: Performed by: HOSPITALIST

## 2024-02-02 PROCEDURE — 94664 DEMO&/EVAL PT USE INHALER: CPT

## 2024-02-02 PROCEDURE — 2580000003 HC RX 258: Performed by: STUDENT IN AN ORGANIZED HEALTH CARE EDUCATION/TRAINING PROGRAM

## 2024-02-02 PROCEDURE — 6370000000 HC RX 637 (ALT 250 FOR IP): Performed by: STUDENT IN AN ORGANIZED HEALTH CARE EDUCATION/TRAINING PROGRAM

## 2024-02-02 PROCEDURE — 2060000000 HC ICU INTERMEDIATE R&B

## 2024-02-02 PROCEDURE — 6370000000 HC RX 637 (ALT 250 FOR IP): Performed by: INTERNAL MEDICINE

## 2024-02-02 PROCEDURE — 94761 N-INVAS EAR/PLS OXIMETRY MLT: CPT

## 2024-02-02 PROCEDURE — 2700000000 HC OXYGEN THERAPY PER DAY

## 2024-02-02 PROCEDURE — 6370000000 HC RX 637 (ALT 250 FOR IP): Performed by: HOSPITALIST

## 2024-02-02 PROCEDURE — 6360000002 HC RX W HCPCS: Performed by: STUDENT IN AN ORGANIZED HEALTH CARE EDUCATION/TRAINING PROGRAM

## 2024-02-02 PROCEDURE — 36415 COLL VENOUS BLD VENIPUNCTURE: CPT

## 2024-02-02 PROCEDURE — 80048 BASIC METABOLIC PNL TOTAL CA: CPT

## 2024-02-02 PROCEDURE — 94640 AIRWAY INHALATION TREATMENT: CPT

## 2024-02-02 RX ORDER — PREDNISONE 20 MG/1
20 TABLET ORAL ONCE
Status: COMPLETED | OUTPATIENT
Start: 2024-02-02 | End: 2024-02-02

## 2024-02-02 RX ORDER — PREDNISONE 20 MG/1
40 TABLET ORAL DAILY
Status: DISCONTINUED | OUTPATIENT
Start: 2024-02-03 | End: 2024-02-04

## 2024-02-02 RX ORDER — TETRAHYDROZOLINE HCL 0.05 %
1 DROPS OPHTHALMIC (EYE) 3 TIMES DAILY PRN
Status: DISCONTINUED | OUTPATIENT
Start: 2024-02-02 | End: 2024-02-04 | Stop reason: HOSPADM

## 2024-02-02 RX ORDER — ACETAMINOPHEN 325 MG/1
650 TABLET ORAL EVERY 6 HOURS SCHEDULED
Status: DISCONTINUED | OUTPATIENT
Start: 2024-02-02 | End: 2024-02-04 | Stop reason: HOSPADM

## 2024-02-02 RX ADMIN — ACETAMINOPHEN 650 MG: 325 TABLET ORAL at 23:15

## 2024-02-02 RX ADMIN — SODIUM CHLORIDE, PRESERVATIVE FREE 10 ML: 5 INJECTION INTRAVENOUS at 20:08

## 2024-02-02 RX ADMIN — PREDNISONE 20 MG: 20 TABLET ORAL at 17:47

## 2024-02-02 RX ADMIN — IPRATROPIUM BROMIDE AND ALBUTEROL SULFATE 1 DOSE: .5; 3 SOLUTION RESPIRATORY (INHALATION) at 14:56

## 2024-02-02 RX ADMIN — AZITHROMYCIN MONOHYDRATE 500 MG: 500 INJECTION, POWDER, LYOPHILIZED, FOR SOLUTION INTRAVENOUS at 20:21

## 2024-02-02 RX ADMIN — ACETAMINOPHEN 650 MG: 325 TABLET ORAL at 17:47

## 2024-02-02 RX ADMIN — IPRATROPIUM BROMIDE AND ALBUTEROL SULFATE 1 DOSE: .5; 3 SOLUTION RESPIRATORY (INHALATION) at 21:16

## 2024-02-02 RX ADMIN — ESCITALOPRAM OXALATE 20 MG: 10 TABLET ORAL at 08:35

## 2024-02-02 RX ADMIN — OSELTAMIVIR PHOSPHATE 75 MG: 75 CAPSULE ORAL at 08:36

## 2024-02-02 RX ADMIN — IPRATROPIUM BROMIDE AND ALBUTEROL SULFATE 1 DOSE: .5; 3 SOLUTION RESPIRATORY (INHALATION) at 08:21

## 2024-02-02 RX ADMIN — IPRATROPIUM BROMIDE AND ALBUTEROL SULFATE 1 DOSE: .5; 3 SOLUTION RESPIRATORY (INHALATION) at 11:52

## 2024-02-02 RX ADMIN — OSELTAMIVIR PHOSPHATE 75 MG: 75 CAPSULE ORAL at 20:07

## 2024-02-02 RX ADMIN — GABAPENTIN 300 MG: 300 CAPSULE ORAL at 17:48

## 2024-02-02 RX ADMIN — ACETAMINOPHEN 650 MG: 325 TABLET ORAL at 11:33

## 2024-02-02 RX ADMIN — PANTOPRAZOLE SODIUM 40 MG: 40 TABLET, DELAYED RELEASE ORAL at 08:36

## 2024-02-02 RX ADMIN — SODIUM CHLORIDE, PRESERVATIVE FREE 10 ML: 5 INJECTION INTRAVENOUS at 08:37

## 2024-02-02 RX ADMIN — WATER 1000 MG: 1 INJECTION INTRAMUSCULAR; INTRAVENOUS; SUBCUTANEOUS at 20:08

## 2024-02-02 RX ADMIN — PREDNISONE 20 MG: 20 TABLET ORAL at 08:35

## 2024-02-02 ASSESSMENT — PAIN SCALES - GENERAL: PAINLEVEL_OUTOF10: 0

## 2024-02-02 NOTE — ED NOTES
TRANSFER - OUT REPORT:    Verbal report given to LEONEL Cooper on Karyna Basilio  being transferred to [facility] for routine progression of patient care       Report consisted of patient's Situation, Background, Assessment and   Recommendations(SBAR).     Information from the following report(s) Nurse Handoff Report, ED Encounter Summary, ED SBAR, Adult Overview, MAR, and Neuro Assessment was reviewed with the receiving nurse.    [place] Fall Assessment:    Presents to emergency department  because of falls (Syncope, seizure, or loss of consciousness): No  Age > 70: No  Altered Mental Status, Intoxication with alcohol or substance confusion (Disorientation, impaired judgment, poor safety awaremess, or inability to follow instructions): No  Impaired Mobility: Ambulates or transfers with assistive devices or assistance; Unable to ambulate or transer.: No  Nursing Judgement: No          Lines:   Peripheral IV 02/01/24 Left Antecubital (Active)   Site Assessment Clean, dry & intact 02/01/24 1910   Line Status Brisk blood return;Flushed;Normal saline locked 02/01/24 1910   Phlebitis Assessment No symptoms 02/01/24 1910   Infiltration Assessment 0 02/01/24 1910        Opportunity for questions and clarification was provided.      Patient transported with:  Monitor and O2 @ 4lpm

## 2024-02-02 NOTE — H&P
Hospitalist Admission Note    NAME:  Karyna Basilio   :  1960   MRN:  [id number]     Date/Time:  2024 11:20 PM    Patient PCP: Brandt Yousif APRN - PHANI  ________________________________________________________________________    Given the patient's current clinical presentation, I have a high level of concern for decompensation if discharged from the emergency department.  Complex decision making was performed, which includes reviewing the patient's available past medical records, laboratory results, and x-ray films.       My assessment of this patient's clinical condition and my plan of care is as follows.    Assessment / Plan:    63 years old female with past medical history of HTN HLD COPD, presented emergency room today complaining of worsening body aches cough dizziness and shortness of breath was found to have flu be positive    # Acute hypoxic respiratory failure  # Flu B  -Patient's normally not on oxygen at home, was found to be at 85% needed 2 L of oxygen to saturate above 90%  -Flu B+  -CT chest:  New groundglass opacities in the posterior left upper lobe likely infectious  or inflammatory.  Plan  -Continue Tamiflu started in the ER  -Start patient empirically on Rocephin/azithromycin received first dose in the emergency room  -Start patient on Tessalon, Robitussin for symptomatic relief of the cough  -Wean oxygen as tolerated by the patient    # COPD: Patient is not wheezing on physical exam  -Continue DuoNebs  -Prednisone oral x 5 days  -Continue to monitor/wean off O2  -Starting details on smoking cessation, patient is not interested at the moment, she declined nicotine patches    # Incidental finding new small area of tree in bud nodularity posterior lateral right lower lobe  is likely infectious or inflammatory as well.  -Most likely due to underlying infection  -Follow-up outpatient    # HTN: Not on any meds at home,  Vitals per protocol    # HLD: Follow-up on lipid panel    #  Diabetes Father     Diabetes Brother     Lung Cancer Brother         Allergies   Allergen Reactions    Nitrofurantoin Hives        Prior to Admission medications    Medication Sig Start Date End Date Taking? Authorizing Provider   gabapentin (NEURONTIN) 300 MG capsule Take 1 capsule by mouth daily. Max Daily Amount: 300 mg   Yes ProviderLatoya MD   diclofenac (VOLTAREN) 75 MG EC tablet Take 1 tablet by mouth as needed for Pain    ProviderLatoya MD   mometasone-formoterol (DULERA) 100-5 MCG/ACT inhaler INHALE 1 PUFF BY MOUTH 2 TIMES EVERY DAY IN THE MORNING AND EVENING  Patient not taking: Reported on 2/1/2024 1/3/24   Idalmis Gonzalez MD   tiotropium (SPIRIVA RESPIMAT) 2.5 MCG/ACT AERS inhaler Inhale 1 puff into the lungs daily  Patient not taking: Reported on 2/1/2024 1/3/24   Idalmis Gonzalez MD   tiZANidine (ZANAFLEX) 4 MG tablet Take 1 tablet by mouth 3 times daily as needed (pain) 10/16/23   Jeniffer Piña PA   omeprazole (PRILOSEC) 20 MG delayed release capsule TAKE 1 CAPSULE BY MOUTH TWICE A DAY (IN MORNING & AFTER DINNER) 9/6/23   Provider, MD Latoya   albuterol sulfate HFA (PROVENTIL;VENTOLIN;PROAIR) 108 (90 Base) MCG/ACT inhaler Inhale 2 puffs into the lungs every 4 hours as needed for Wheezing 9/26/23   Idalmis Gonzalez MD   omeprazole (PRILOSEC OTC) 20 MG tablet Take one tablet by mouth twice a day in the AM and after dinner 9/6/23   Brandt Yousif APRN - NP   escitalopram (LEXAPRO) 20 MG tablet Take 1 tablet by mouth daily 9/6/23   Brandt Yousif APRN - NP   albuterol (PROVENTIL) (2.5 MG/3ML) 0.083% nebulizer solution Inhale 3 mLs into the lungs every 4 hours as needed 10/17/22   Automatic Reconciliation, Ar   aspirin-acetaminophen-caffeine (EXCEDRIN MIGRAINE) 250-250-65 MG per tablet Take 3 tablets by mouth daily    Automatic Reconciliation, Ar       REVIEW OF SYSTEMS:  See HPI for details  General: Positive for fever chills generalized fatigue positive for  Eyes:

## 2024-02-02 NOTE — PROGRESS NOTES
Hospitalist Progress Note    NAME: Karyna Basilio   :  1960   MRN:  [id number]     Date/Time:  2024 8:28 AM    Patient PCP: Brandt Yousif APRN - NP       Subjective:   CHIEF COMPLAINT:  hypoxia       Patient remains on 2L O2, satting 90%.  Persistent cough, non-productive.  + wheeze.  + rhonchi.  No fever. Also reported visual changes developing over the past few weeks, including blurry vision, rainbow bursts, and floaters.        Objective:   VITALS:    Vitals:    24 0715   BP: (!) 123/41   Pulse: 69   Resp: 21   Temp: 98.8 °F (37.1 °C)   SpO2:        PHYSICAL EXAM:      General:   No acute distress, resting comfortably in bed.  Heart:  RRR, No MRG  Lungs: + rhonchi and wheeze.  Mildly labored breathing  Abdomen:  Soft .  Nondistended.  NTTP.  BS present.  Extremities:  No edema.  Skin:  No rash  Neuro:  Alert and interactive.  No focal deficits.  Psych: Mood stable.          LAB DATA REVIEWED:    Recent Results (from the past 24 hour(s))   EKG 12 Lead    Collection Time: 24  3:57 PM   Result Value Ref Range    Ventricular Rate 76 BPM    Atrial Rate 76 BPM    P-R Interval 140 ms    QRS Duration 76 ms    Q-T Interval 426 ms    QTc Calculation (Bazett) 479 ms    P Axis 74 degrees    R Axis 83 degrees    T Axis 75 degrees    Diagnosis       Normal sinus rhythm  Possible Left atrial enlargement  Possible Inferior infarct , age undetermined  Anterolateral infarct , age undetermined  Abnormal ECG  No previous ECGs available  Confirmed by Skyler Ball MD. ([id number]) on 2024 10:02:18 PM     COVID-19 & Influenza Combo    Collection Time: 24  4:03 PM    Specimen: Nasopharyngeal   Result Value Ref Range    SARS-CoV-2, PCR Not detected NOTD      Rapid Influenza A By PCR Detected (A) NOTD      Rapid Influenza B By PCR Not detected NOTD     CMP    Collection Time: 24  4:37 PM   Result Value Ref Range    Sodium 127 (L) 136 - 145 mmol/L    Potassium 3.5 3.5 - 5.1 mmol/L    Chloride 86

## 2024-02-02 NOTE — PROGRESS NOTES
2305: Patient arrived via [facility]. Report received from charge RNAllison. Writer assumed care of patient at this time.

## 2024-02-02 NOTE — CARE COORDINATION
Transportation Car   Occupation Part time employment   Type of Occupation [employer]   Discharge Planning   Type of Residence Apartment   Living Arrangements Children;Family Members   Patient expects to be discharged to: Apartment   One/Two Story Residence One story     JAVIER NGUYEN, MSW

## 2024-02-02 NOTE — ED NOTES
TRANSFER - OUT REPORT:    Verbal report given to [facility] medic on Karyna Basilio  being transferred to [place] ED for routine progression of patient care       Report consisted of patient's Situation, Background, Assessment and   Recommendations(SBAR).     Information from the following report(s) Nurse Handoff Report, ED Encounter Summary, ED SBAR, Adult Overview, and Neuro Assessment was reviewed with the receiving nurse.    [place] Fall Assessment:    Presents to emergency department  because of falls (Syncope, seizure, or loss of consciousness): No  Age > 70: No  Altered Mental Status, Intoxication with alcohol or substance confusion (Disorientation, impaired judgment, poor safety awaremess, or inability to follow instructions): No  Impaired Mobility: Ambulates or transfers with assistive devices or assistance; Unable to ambulate or transer.: No  Nursing Judgement: No          Lines:   Peripheral IV 02/01/24 Left Antecubital (Active)   Site Assessment Clean, dry & intact 02/01/24 1910   Line Status Brisk blood return;Flushed;Normal saline locked 02/01/24 1910   Phlebitis Assessment No symptoms 02/01/24 1910   Infiltration Assessment 0 02/01/24 1910        Opportunity for questions and clarification was provided.      Patient transported with:  Monitor and O2 @ 4lpm

## 2024-02-02 NOTE — PROGRESS NOTES
1405: TRANSFER - IN REPORT:    Verbal report received from Stella CASTANEDA on Karyna Basilio  being received from ED for routine progression of patient care      Report consisted of patient's Situation, Background, Assessment and   Recommendations(SBAR).     Information from the following report(s) Nurse Handoff Report, Index, Adult Overview, Intake/Output, MAR, Recent Results, Cardiac Rhythm NSR, and Neuro Assessment was reviewed with the receiving nurse.    Opportunity for questions and clarification was provided.      Assessment completed upon patient's arrival to unit and care assumed.     1600: Pt made this RN aware that for the past week, she has been experiencing increasing difficulty with vision. Includes rainbow-like starburst flashes of color in L eye and possible floaters. Neuro assessment unremarkable aside from baseline numbness/tingling in bilateral upper and lower extremities. Notified Dr. Raza. Response from physician, \"Ok.\" No orders noted at this time. Will continue to monitor.    1632: Pt indicated that upon admission she was experiencing diarrhea, and had multiple episodes in the ED. Noted this during admission required documentation. EPIC is requiring me to confirm whether to proceed with C-Diff rule-out protocol. Reached out to Dr. Raza for guidance. Response from physician, \"Ok.\"    1656: Reached back around to Dr. Raza for clarity on whether to move forward with C-Diff rule-out protocol. Response from physician, \"Ok.\" As no guidance is currently being provided, deferring action until further notice. Will continue to monitor. Concerns noted to charge nurse.    1900: Bedside and Verbal shift change report given to Nelly RN (oncoming nurse) by Nini CASTANEDA (offgoing nurse). Report included the following information Nurse Handoff Report, Index, Adult Overview, Intake/Output, MAR, Recent Results, Cardiac Rhythm NSR, and Neuro Assessment.

## 2024-02-03 LAB
ACCESSION NUMBER, LLC1M: ABNORMAL
ACINETOBACTER CALCOAC BAUMANNII COMPLEX BY PCR: NOT DETECTED
ANION GAP SERPL CALC-SCNC: 3 MMOL/L (ref 5–15)
ANION GAP SERPL CALC-SCNC: 4 MMOL/L (ref 5–15)
B FRAGILIS DNA BLD POS QL NAA+NON-PROBE: NOT DETECTED
BIOFIRE TEST COMMENT: ABNORMAL
BUN SERPL-MCNC: 11 MG/DL (ref 6–20)
BUN SERPL-MCNC: 11 MG/DL (ref 6–20)
BUN/CREAT SERPL: 17 (ref 12–20)
BUN/CREAT SERPL: 19 (ref 12–20)
C ALBICANS DNA BLD POS QL NAA+NON-PROBE: NOT DETECTED
C AURIS DNA BLD POS QL NAA+NON-PROBE: NOT DETECTED
C GATTII+NEOFOR DNA BLD POS QL NAA+N-PRB: NOT DETECTED
C GLABRATA DNA BLD POS QL NAA+NON-PROBE: NOT DETECTED
C KRUSEI DNA BLD POS QL NAA+NON-PROBE: NOT DETECTED
C PARAP DNA BLD POS QL NAA+NON-PROBE: NOT DETECTED
C TROPICLS DNA BLD POS QL NAA+NON-PROBE: NOT DETECTED
CALCIUM SERPL-MCNC: 8.7 MG/DL (ref 8.5–10.1)
CALCIUM SERPL-MCNC: 8.9 MG/DL (ref 8.5–10.1)
CHLORIDE SERPL-SCNC: 108 MMOL/L (ref 97–108)
CHLORIDE SERPL-SCNC: 108 MMOL/L (ref 97–108)
CO2 SERPL-SCNC: 27 MMOL/L (ref 21–32)
CO2 SERPL-SCNC: 28 MMOL/L (ref 21–32)
CREAT SERPL-MCNC: 0.59 MG/DL (ref 0.55–1.02)
CREAT SERPL-MCNC: 0.64 MG/DL (ref 0.55–1.02)
E CLOAC COMP DNA BLD POS NAA+NON-PROBE: NOT DETECTED
E COLI DNA BLD POS QL NAA+NON-PROBE: NOT DETECTED
E FAECALIS DNA BLD POS QL NAA+NON-PROBE: NOT DETECTED
E FAECIUM DNA BLD POS QL NAA+NON-PROBE: NOT DETECTED
ENTEROBACTERALES DNA BLD POS NAA+N-PRB: NOT DETECTED
ERYTHROCYTE [DISTWIDTH] IN BLOOD BY AUTOMATED COUNT: 12.7 % (ref 11.5–14.5)
GLUCOSE BLD STRIP.AUTO-MCNC: 140 MG/DL (ref 65–117)
GLUCOSE SERPL-MCNC: 138 MG/DL (ref 65–100)
GLUCOSE SERPL-MCNC: 150 MG/DL (ref 65–100)
GP B STREP DNA BLD POS QL NAA+NON-PROBE: NOT DETECTED
HAEM INFLU DNA BLD POS QL NAA+NON-PROBE: NOT DETECTED
HCT VFR BLD AUTO: 39.4 % (ref 35–47)
HGB BLD-MCNC: 13.2 G/DL (ref 11.5–16)
K OXYTOCA DNA BLD POS QL NAA+NON-PROBE: NOT DETECTED
KLEBSIELLA SP DNA BLD POS QL NAA+NON-PRB: NOT DETECTED
KLEBSIELLA SP DNA BLD POS QL NAA+NON-PRB: NOT DETECTED
L MONOCYTOG DNA BLD POS QL NAA+NON-PROBE: NOT DETECTED
MAGNESIUM SERPL-MCNC: 1.9 MG/DL (ref 1.6–2.4)
MCH RBC QN AUTO: 32.4 PG (ref 26–34)
MCHC RBC AUTO-ENTMCNC: 33.5 G/DL (ref 30–36.5)
MCV RBC AUTO: 96.6 FL (ref 80–99)
MECA+MECC ISLT/SPM QL: DETECTED
N MEN DNA BLD POS QL NAA+NON-PROBE: NOT DETECTED
NRBC # BLD: 0 K/UL (ref 0–0.01)
NRBC BLD-RTO: 0 PER 100 WBC
P AERUGINOSA DNA BLD POS NAA+NON-PROBE: NOT DETECTED
PLATELET # BLD AUTO: 166 K/UL (ref 150–400)
PMV BLD AUTO: 9.5 FL (ref 8.9–12.9)
POTASSIUM SERPL-SCNC: 3.9 MMOL/L (ref 3.5–5.1)
POTASSIUM SERPL-SCNC: ABNORMAL MMOL/L (ref 3.5–5.1)
PROTEUS SP DNA BLD POS QL NAA+NON-PROBE: NOT DETECTED
RBC # BLD AUTO: 4.08 M/UL (ref 3.8–5.2)
RESISTANT GENE TARGETS: ABNORMAL
S AUREUS DNA BLD POS QL NAA+NON-PROBE: NOT DETECTED
S AUREUS+CONS DNA BLD POS NAA+NON-PROBE: DETECTED
S EPIDERMIDIS DNA BLD POS QL NAA+NON-PRB: DETECTED
S LUGDUNENSIS DNA BLD POS QL NAA+NON-PRB: NOT DETECTED
S MALTOPHILIA DNA BLD POS QL NAA+NON-PRB: NOT DETECTED
S MARCESCENS DNA BLD POS NAA+NON-PROBE: NOT DETECTED
S PNEUM DNA BLD POS QL NAA+NON-PROBE: NOT DETECTED
S PYO DNA BLD POS QL NAA+NON-PROBE: NOT DETECTED
SALMONELLA DNA BLD POS QL NAA+NON-PROBE: NOT DETECTED
SERVICE CMNT-IMP: ABNORMAL
SODIUM SERPL-SCNC: 139 MMOL/L (ref 136–145)
SODIUM SERPL-SCNC: 139 MMOL/L (ref 136–145)
STREPTOCOCCUS DNA BLD POS NAA+NON-PROBE: NOT DETECTED
WBC # BLD AUTO: 5.4 K/UL (ref 3.6–11)

## 2024-02-03 PROCEDURE — 6360000002 HC RX W HCPCS: Performed by: STUDENT IN AN ORGANIZED HEALTH CARE EDUCATION/TRAINING PROGRAM

## 2024-02-03 PROCEDURE — 82962 GLUCOSE BLOOD TEST: CPT

## 2024-02-03 PROCEDURE — 6370000000 HC RX 637 (ALT 250 FOR IP): Performed by: HOSPITALIST

## 2024-02-03 PROCEDURE — 36415 COLL VENOUS BLD VENIPUNCTURE: CPT

## 2024-02-03 PROCEDURE — 6370000000 HC RX 637 (ALT 250 FOR IP): Performed by: STUDENT IN AN ORGANIZED HEALTH CARE EDUCATION/TRAINING PROGRAM

## 2024-02-03 PROCEDURE — 83735 ASSAY OF MAGNESIUM: CPT

## 2024-02-03 PROCEDURE — 6370000000 HC RX 637 (ALT 250 FOR IP): Performed by: NURSE PRACTITIONER

## 2024-02-03 PROCEDURE — 2580000003 HC RX 258: Performed by: HOSPITALIST

## 2024-02-03 PROCEDURE — 1100000000 HC RM PRIVATE

## 2024-02-03 PROCEDURE — 94761 N-INVAS EAR/PLS OXIMETRY MLT: CPT

## 2024-02-03 PROCEDURE — 2700000000 HC OXYGEN THERAPY PER DAY

## 2024-02-03 PROCEDURE — 85027 COMPLETE CBC AUTOMATED: CPT

## 2024-02-03 PROCEDURE — 2580000003 HC RX 258

## 2024-02-03 PROCEDURE — 94669 MECHANICAL CHEST WALL OSCILL: CPT

## 2024-02-03 PROCEDURE — 6360000002 HC RX W HCPCS

## 2024-02-03 PROCEDURE — 94640 AIRWAY INHALATION TREATMENT: CPT

## 2024-02-03 PROCEDURE — 80048 BASIC METABOLIC PNL TOTAL CA: CPT

## 2024-02-03 PROCEDURE — 2580000003 HC RX 258: Performed by: STUDENT IN AN ORGANIZED HEALTH CARE EDUCATION/TRAINING PROGRAM

## 2024-02-03 PROCEDURE — 6360000002 HC RX W HCPCS: Performed by: HOSPITALIST

## 2024-02-03 PROCEDURE — 6370000000 HC RX 637 (ALT 250 FOR IP): Performed by: INTERNAL MEDICINE

## 2024-02-03 RX ORDER — LANOLIN ALCOHOL/MO/W.PET/CERES
6 CREAM (GRAM) TOPICAL NIGHTLY PRN
Status: DISCONTINUED | OUTPATIENT
Start: 2024-02-03 | End: 2024-02-04 | Stop reason: HOSPADM

## 2024-02-03 RX ADMIN — OSELTAMIVIR PHOSPHATE 75 MG: 75 CAPSULE ORAL at 09:07

## 2024-02-03 RX ADMIN — AZITHROMYCIN MONOHYDRATE 500 MG: 500 INJECTION, POWDER, LYOPHILIZED, FOR SOLUTION INTRAVENOUS at 19:55

## 2024-02-03 RX ADMIN — ACETAMINOPHEN 650 MG: 325 TABLET ORAL at 12:15

## 2024-02-03 RX ADMIN — VANCOMYCIN HYDROCHLORIDE 1750 MG: 10 INJECTION, POWDER, LYOPHILIZED, FOR SOLUTION INTRAVENOUS at 05:35

## 2024-02-03 RX ADMIN — PANTOPRAZOLE SODIUM 40 MG: 40 TABLET, DELAYED RELEASE ORAL at 09:07

## 2024-02-03 RX ADMIN — SODIUM CHLORIDE, PRESERVATIVE FREE 10 ML: 5 INJECTION INTRAVENOUS at 20:00

## 2024-02-03 RX ADMIN — ACETAMINOPHEN 650 MG: 325 TABLET ORAL at 05:30

## 2024-02-03 RX ADMIN — OSELTAMIVIR PHOSPHATE 75 MG: 75 CAPSULE ORAL at 19:57

## 2024-02-03 RX ADMIN — ESCITALOPRAM OXALATE 20 MG: 10 TABLET ORAL at 09:07

## 2024-02-03 RX ADMIN — WATER 1000 MG: 1 INJECTION INTRAMUSCULAR; INTRAVENOUS; SUBCUTANEOUS at 19:50

## 2024-02-03 RX ADMIN — PREDNISONE 40 MG: 20 TABLET ORAL at 09:07

## 2024-02-03 RX ADMIN — IPRATROPIUM BROMIDE AND ALBUTEROL SULFATE 1 DOSE: .5; 3 SOLUTION RESPIRATORY (INHALATION) at 07:49

## 2024-02-03 RX ADMIN — IPRATROPIUM BROMIDE AND ALBUTEROL SULFATE 1 DOSE: .5; 3 SOLUTION RESPIRATORY (INHALATION) at 15:40

## 2024-02-03 RX ADMIN — GABAPENTIN 300 MG: 300 CAPSULE ORAL at 17:59

## 2024-02-03 RX ADMIN — SODIUM CHLORIDE, PRESERVATIVE FREE 10 ML: 5 INJECTION INTRAVENOUS at 09:07

## 2024-02-03 RX ADMIN — ACETAMINOPHEN 650 MG: 325 TABLET ORAL at 17:49

## 2024-02-03 RX ADMIN — Medication 6 MG: at 22:10

## 2024-02-03 RX ADMIN — IPRATROPIUM BROMIDE AND ALBUTEROL SULFATE 1 DOSE: .5; 3 SOLUTION RESPIRATORY (INHALATION) at 19:13

## 2024-02-03 RX ADMIN — VANCOMYCIN HYDROCHLORIDE 1000 MG: 1 INJECTION, POWDER, LYOPHILIZED, FOR SOLUTION INTRAVENOUS at 17:59

## 2024-02-03 RX ADMIN — BUTALBITAL, ACETAMINOPHEN, AND CAFFEINE 1 TABLET: 50; 325; 40 TABLET ORAL at 22:10

## 2024-02-03 RX ADMIN — IPRATROPIUM BROMIDE AND ALBUTEROL SULFATE 1 DOSE: .5; 3 SOLUTION RESPIRATORY (INHALATION) at 11:15

## 2024-02-03 ASSESSMENT — PAIN SCALES - GENERAL: PAINLEVEL_OUTOF10: 3

## 2024-02-03 ASSESSMENT — PAIN DESCRIPTION - LOCATION: LOCATION: HEAD

## 2024-02-03 NOTE — FLOWSHEET NOTE
1/2 bottles w/ gram pos cocci in clusters. On culture, staph epidermidis noted. Possible contaminate but will cover w/ vanc nonetheless. Pharm to dose vanc. Nursing to continue to monitor.

## 2024-02-03 NOTE — PROGRESS NOTES
[facility] Dosing Services: Antimicrobial Stewardship Daily Doc  Consult for antibiotic dosing of Vancomycin by Mandi Truong APRN - NP   Indication: Bacteremia  Day of Therapy: 1    Ht Readings from Last 1 Encounters:   02/01/24 1.626 m (5' 4\")        Wt Readings from Last 1 Encounters:   02/01/24 68.5 kg (151 lb)      Vancomycin therapy:  Loading dose: Vancomycin 1750 mg x1 dose given 2/3 @ 0535  Maintenance dose: Vancomycin 1000 mg IV every 12 hours   Dose calculated to approximate a           a. Target AUC/JOSIE of 400-600          b. Trough of 15-20 mcg/mL   Last level:   mcg/mL  Plan: WBC 5.4, Scr 0.64, afebrile. BMP ordered daily x 5 days. Will order scheduled dosing for vancomycin 1000 mg IV every 12 hours (insight Rx predicts , Tr 12.8, T1/2 = 8.45). Plan to order random vanc level in 24-48 hours (not yet ordered).    Dose administration notes: Doses given appropriately as scheduled      Other Antimicrobial   (not dosed by pharmacist) Rocephin 1 g q 24h -- last dose 2/5  Azithromycin 500 mg q 24h -- last dose 2/5   Cultures 2/1 Blood Cx: NGTD - prelim  2/1 Blood Cx: Probable CoNS growing in 1 of 2 bottles, MRSE detected on PCR - prelim  2/1 Influenza A/B rapid: Influenza A detected on PCR - F  2/1 COVID: negative - F   Serum Creatinine Lab Results   Component Value Date/Time    CREATININE 0.64 02/03/2024 03:47 AM          Creatinine Clearance Estimated Creatinine Clearance: 88 mL/min (based on SCr of 0.64 mg/dL).     Temp Temp: 98.6 °F (37 °C) (Oral)       WBC Lab Results   Component Value Date/Time    WBC 5.4 02/03/2024 03:47 AM          Procalcitonin No results found for: \"PCT\"     For Antifungals, Metronidazole and Nafcillin: Lab Results   Component Value Date/Time    ALT 18 02/01/2024 04:37 PM    AST 34 02/01/2024 04:37 PM        Pharmacist: Tadeo Abraham, [facility]  [phone]

## 2024-02-03 NOTE — PROGRESS NOTES
0700: Bedside and Verbal shift change report given to Nini RN (oncoming nurse) by Nelly RN (offgoing nurse). Report included the following information Nurse Handoff Report, Index, Adult Overview, Intake/Output, MAR, Recent Results, Cardiac Rhythm NSR, and Neuro Assessment.     0918: During morning assessment, pt noted no bowel mov'ts overnight; however, did go into detail about bowel habits prior to admission. Noted that she experienced episodes of bahman blood in stool within a week prior to admission. No current episodes of bloody stool. Notified Dr. Oneil. No new orders at this time. Will continue to monitor for any changes with next bowel movement.    1355: Pt has bed available in room 410. Offered to call family to notify of pt transfer. Pt declined. Was to call family personally on cell phone to inform.    1400: TRANSFER - OUT REPORT:    Verbal report given to Charles CASTANEDA on Karyna Basilio  being transferred to 4th floor for routine progression of patient care       Report consisted of patient's Situation, Background, Assessment and   Recommendations(SBAR).     Information from the following report(s) Nurse Handoff Report, Index, Adult Overview, Intake/Output, MAR, Recent Results, Cardiac Rhythm NSR, and Neuro Assessment was reviewed with the receiving nurse.           Lines:   Peripheral IV 02/01/24 Left Antecubital (Active)   Site Assessment Clean, dry & intact 02/03/24 0830   Line Status Capped;Flushed 02/03/24 0830   Line Care Cap changed;Connections checked and tightened 02/03/24 0830   Phlebitis Assessment No symptoms 02/03/24 0830   Infiltration Assessment 0 02/03/24 0830   Alcohol Cap Used Yes 02/03/24 0830   Dressing Status Clean, dry & intact 02/03/24 0830   Dressing Type Transparent 02/03/24 0830        Opportunity for questions and clarification was provided.      Patient transported with:  Tech

## 2024-02-03 NOTE — PROGRESS NOTES
Blood cultures 1 out of 2 bottles came back gram positive cocci in clusters. PHANI Raymond made aware. No new orders made.

## 2024-02-03 NOTE — PLAN OF CARE
Problem: Respiratory - Adult  Goal: Achieves optimal ventilation and oxygenation  2/2/2024 2216 by Ovi Kenny, RN  Outcome: Progressing  2/2/2024 2122 by Cleo Jones, RT  Outcome: Progressing     Problem: ABCDS Injury Assessment  Goal: Absence of physical injury  Outcome: Progressing

## 2024-02-04 VITALS
HEIGHT: 64 IN | DIASTOLIC BLOOD PRESSURE: 84 MMHG | HEART RATE: 66 BPM | RESPIRATION RATE: 18 BRPM | SYSTOLIC BLOOD PRESSURE: 131 MMHG | BODY MASS INDEX: 25.78 KG/M2 | WEIGHT: 151 LBS | TEMPERATURE: 98.4 F | OXYGEN SATURATION: 93 %

## 2024-02-04 LAB
ANION GAP SERPL CALC-SCNC: 4 MMOL/L (ref 5–15)
BACTERIA SPEC CULT: ABNORMAL
BACTERIA SPEC CULT: ABNORMAL
BUN SERPL-MCNC: 10 MG/DL (ref 6–20)
BUN/CREAT SERPL: 16 (ref 12–20)
CALCIUM SERPL-MCNC: 8.7 MG/DL (ref 8.5–10.1)
CHLORIDE SERPL-SCNC: 109 MMOL/L (ref 97–108)
CO2 SERPL-SCNC: 31 MMOL/L (ref 21–32)
CREAT SERPL-MCNC: 0.61 MG/DL (ref 0.55–1.02)
GLUCOSE SERPL-MCNC: 110 MG/DL (ref 65–100)
POTASSIUM SERPL-SCNC: 3.3 MMOL/L (ref 3.5–5.1)
SERVICE CMNT-IMP: ABNORMAL
SODIUM SERPL-SCNC: 144 MMOL/L (ref 136–145)

## 2024-02-04 PROCEDURE — 6370000000 HC RX 637 (ALT 250 FOR IP): Performed by: STUDENT IN AN ORGANIZED HEALTH CARE EDUCATION/TRAINING PROGRAM

## 2024-02-04 PROCEDURE — 94640 AIRWAY INHALATION TREATMENT: CPT

## 2024-02-04 PROCEDURE — 80048 BASIC METABOLIC PNL TOTAL CA: CPT

## 2024-02-04 PROCEDURE — 94618 PULMONARY STRESS TESTING: CPT

## 2024-02-04 PROCEDURE — 2580000003 HC RX 258: Performed by: HOSPITALIST

## 2024-02-04 PROCEDURE — 6370000000 HC RX 637 (ALT 250 FOR IP): Performed by: HOSPITALIST

## 2024-02-04 PROCEDURE — 2700000000 HC OXYGEN THERAPY PER DAY

## 2024-02-04 PROCEDURE — 6360000002 HC RX W HCPCS: Performed by: HOSPITALIST

## 2024-02-04 PROCEDURE — 6360000002 HC RX W HCPCS: Performed by: STUDENT IN AN ORGANIZED HEALTH CARE EDUCATION/TRAINING PROGRAM

## 2024-02-04 PROCEDURE — 36415 COLL VENOUS BLD VENIPUNCTURE: CPT

## 2024-02-04 PROCEDURE — 6370000000 HC RX 637 (ALT 250 FOR IP)

## 2024-02-04 PROCEDURE — 94761 N-INVAS EAR/PLS OXIMETRY MLT: CPT

## 2024-02-04 RX ORDER — OSELTAMIVIR PHOSPHATE 75 MG/1
75 CAPSULE ORAL 2 TIMES DAILY
Qty: 4 CAPSULE | Refills: 0 | Status: SHIPPED | OUTPATIENT
Start: 2024-02-04 | End: 2024-02-06

## 2024-02-04 RX ORDER — AZITHROMYCIN 500 MG/1
500 TABLET, FILM COATED ORAL DAILY
Qty: 2 TABLET | Refills: 0 | Status: SHIPPED | OUTPATIENT
Start: 2024-02-04 | End: 2024-02-06

## 2024-02-04 RX ORDER — CEPHALEXIN 500 MG/1
500 CAPSULE ORAL 4 TIMES DAILY
Qty: 8 CAPSULE | Refills: 0 | Status: SHIPPED | OUTPATIENT
Start: 2024-02-04 | End: 2024-02-06

## 2024-02-04 RX ORDER — BUDESONIDE AND FORMOTEROL FUMARATE DIHYDRATE 160; 4.5 UG/1; UG/1
2 AEROSOL RESPIRATORY (INHALATION) 2 TIMES DAILY
Qty: 30.6 G | Refills: 1 | Status: SHIPPED | OUTPATIENT
Start: 2024-02-04

## 2024-02-04 RX ORDER — AZITHROMYCIN 250 MG/1
500 TABLET, FILM COATED ORAL DAILY
Status: DISCONTINUED | OUTPATIENT
Start: 2024-02-04 | End: 2024-02-04 | Stop reason: HOSPADM

## 2024-02-04 RX ORDER — ARFORMOTEROL TARTRATE 15 UG/2ML
15 SOLUTION RESPIRATORY (INHALATION)
Status: DISCONTINUED | OUTPATIENT
Start: 2024-02-04 | End: 2024-02-04 | Stop reason: HOSPADM

## 2024-02-04 RX ORDER — BUDESONIDE 0.5 MG/2ML
0.5 INHALANT ORAL
Status: DISCONTINUED | OUTPATIENT
Start: 2024-02-04 | End: 2024-02-04 | Stop reason: HOSPADM

## 2024-02-04 RX ORDER — IPRATROPIUM BROMIDE AND ALBUTEROL SULFATE 2.5; .5 MG/3ML; MG/3ML
1 SOLUTION RESPIRATORY (INHALATION)
Status: DISCONTINUED | OUTPATIENT
Start: 2024-02-04 | End: 2024-02-04 | Stop reason: HOSPADM

## 2024-02-04 RX ADMIN — OSELTAMIVIR PHOSPHATE 75 MG: 75 CAPSULE ORAL at 08:06

## 2024-02-04 RX ADMIN — ACETAMINOPHEN 650 MG: 325 TABLET ORAL at 17:19

## 2024-02-04 RX ADMIN — ACETAMINOPHEN 650 MG: 325 TABLET ORAL at 05:05

## 2024-02-04 RX ADMIN — ACETAMINOPHEN 650 MG: 325 TABLET ORAL at 12:28

## 2024-02-04 RX ADMIN — POTASSIUM BICARBONATE 40 MEQ: 782 TABLET, EFFERVESCENT ORAL at 08:05

## 2024-02-04 RX ADMIN — ESCITALOPRAM OXALATE 20 MG: 10 TABLET ORAL at 08:05

## 2024-02-04 RX ADMIN — ARFORMOTEROL TARTRATE 15 MCG: 15 SOLUTION RESPIRATORY (INHALATION) at 07:20

## 2024-02-04 RX ADMIN — GABAPENTIN 300 MG: 300 CAPSULE ORAL at 17:19

## 2024-02-04 RX ADMIN — IPRATROPIUM BROMIDE AND ALBUTEROL SULFATE 1 DOSE: .5; 3 SOLUTION RESPIRATORY (INHALATION) at 07:15

## 2024-02-04 RX ADMIN — TETRAHYDROZOLINE HCL 1 DROP: 0.05 SOLUTION/ DROPS OPHTHALMIC at 09:39

## 2024-02-04 RX ADMIN — BUDESONIDE 500 MCG: 0.5 INHALANT RESPIRATORY (INHALATION) at 07:20

## 2024-02-04 RX ADMIN — SODIUM CHLORIDE, PRESERVATIVE FREE 10 ML: 5 INJECTION INTRAVENOUS at 08:08

## 2024-02-04 RX ADMIN — PANTOPRAZOLE SODIUM 40 MG: 40 TABLET, DELAYED RELEASE ORAL at 08:06

## 2024-02-04 ASSESSMENT — PAIN SCALES - GENERAL
PAINLEVEL_OUTOF10: 3
PAINLEVEL_OUTOF10: 1

## 2024-02-04 ASSESSMENT — 6 MINUTE WALK TEST (6MWT)
SYMPTOMS: SOB
O2 SATURATION: 90
COMMENTS: PATIENT
BORG DYSPNEA SCALE SCORE: 0.5
HEART RATE: 92
TOTAL DISTANCE WALKED (M): 200
BORG DYSPNEA SCALE SCORE: 0
BORG FATIGUE SCALE SCORE: 0.5
HEART RATE: 65
HEART RATE: 88
HEART RATE: 79
OXYGEN DEVICE: ROOM AIR
DID PATIENT STOP OR PAUSE BEFORE 6 MINUTES?: NO
BORG DYSPNEA SCALE SCORE: 0.5

## 2024-02-04 ASSESSMENT — PAIN DESCRIPTION - LOCATION: LOCATION: HEAD

## 2024-02-04 NOTE — PROGRESS NOTES
[facility]  [address] [place] [address] [phone]        Hospitalist Progress Note      NAME: aKryna Basilio   :  1960  MRM:  [id number]    Date/Time of service: 2/3/2024  8:33 PM       Subjective:     Chief Complaint:  Patient was personally seen and examined by me during this time period.  Chart reviewed.  F/up SURYA PNA and flu and COPD exas    Feeling well today without concerns or complaints       Objective:       Vitals:       Last 24hrs VS reviewed since prior progress note. Most recent are:    Vitals:    24   BP: (!) 162/92   Pulse: 88   Resp:    Temp:    SpO2: 96%     SpO2 Readings from Last 6 Encounters:   24 96%   23 92%   10/16/23 98%   23 92%          Intake/Output Summary (Last 24 hours) at 2/3/2024 2033  Last data filed at 2024 2248  Gross per 24 hour   Intake 500 ml   Output --   Net 500 ml        Exam:     Physical Exam:    Gen:   in no acute distress  HEENT:  Pink conjunctivae, EOMI, hearing intact to voice, moist mucous membranes  Neck:  Supple, without masses, thyroid non-tender  Resp:  No accessory muscle use, ronchi throughout  Card:  No murmurs, normal S1, S2 without thrills, bruits or peripheral edema  Abd:  Soft, non-tender, non-distended  Musc:  No cyanosis or clubbing  Skin:  No rashes or ulcers, skin turgor is good  Neuro:  follows commands appropriately  Psych:  Good insight, oriented to person, place and time, alert      Medications Reviewed: (see below)    Lab Data Reviewed: (see below)    ______________________________________________________________________    Medications:     Current Facility-Administered Medications   Medication Dose Route Frequency    acetaminophen (TYLENOL) tablet 650 mg  650 mg Oral 4 times per day    predniSONE (DELTASONE) tablet 40 mg  40 mg Oral Daily    tetrahydrozoline 0.05 % ophthalmic solution 1 drop  1 drop Both Eyes TID PRN    escitalopram (LEXAPRO) tablet 20

## 2024-02-04 NOTE — PROGRESS NOTES
Pharmacy Dosing Services: 02/04/24    The pharmacist has determined that this patient meets P & T approved criteria for conversion from IV to oral therapy for the following medication: Azithromycin 500 mg IV daily      The pharmacist has written the following order for the patient: Azithromycin 500 mg tablet po daily x 2 doses  The pharmacist will continue to monitor the patient's status and advise the physician if conversion back to IV therapy is recommended.    Signed Tadeo Abraham RPH Contact information: [phone]

## 2024-02-04 NOTE — DISCHARGE SUMMARY
Hospitalist Discharge Summary     Patient ID:  Karyna Basilio  [id number]  63 y.o.  1960    Admit date: 2/1/2024    Discharge date and time: 2/4/2024    Admission Diagnoses: COPD (chronic obstructive pulmonary disease) ([facility]) [J44.9]  Hyponatremia [E87.1]  Influenza [J11.1]  Acute respiratory failure with hypoxia ([facility]) [J96.01]    Discharge Diagnoses:    Principal Problem:    COPD (chronic obstructive pulmonary disease) ([facility])  Resolved Problems:    * No resolved hospital problems. *         Hospital Course:   63 year old female with history of HTN, HLD, COPD, presenting  with body aches, cough, dizziness, and shortness of breath secondary to acute influenza B infection     Acute hypoxic respiratory failure / Influenza B / Pneumonia, SURYA: POA.CT chest:  New groundglass opacities in the posterior left upper lobe likely infectious or inflammatory.  - Continue Tamiflu  - was on Rocephin/azithromycin. Switched to keflex and azithro at discharge  - Off O2 and passed O2 eval     CoNS growth on BCx: 1/4 bottles. Likely contaminant. Nothing points to her being bacteremic. afebrile  - s/p vanc     COPD: POA. In mild acute exacerbation. Improved.  - stopped steroids  - 2 more days of abx  - albuterol PRN  - symbicort BID at discharge     Tobacco dependence: POA  - ready to quit  - declined nicotine patches     Tree in bud nodularity posterior lateral right lower lobe: POA. Most likely infectious or inflammatory   -Follow-up outpatient     Non-specific bilateral vision changes, subacute: Likely related to hypoxia. Resolved. No other neurologic symptoms to suggest stroke  - refer to ophthalmology as outpatient if problems return     HTN: POA. Not on meds at home  - BP stable  - monitor     HLD: POA. Chronic. LDL 94 which is at goal for someone who has never had ACS/stroke.  - outpt follow up     Neuropathy/arthritis: POA. Chronic  - COnt gabapentin     Depression: POA. Chronic  - Cont lexapro    Imaging  CT CHEST WO

## 2024-02-04 NOTE — DISCHARGE INSTRUCTIONS
HOSPITALIST DISCHARGE INSTRUCTIONS  NAME:  Karyna Basilio   :  1960   MRN:  [id number]     Date/Time:  2024 6:41 PM    ADMIT DATE: 2024     DISCHARGE DATE: 2024     DISCHARGE DIAGNOSIS:  Pneumonia, influenza, COPD exacerbation    DISCHARGE INSTRUCTIONS:  Thank you for allowing us to participate in your care. Your discharging Hospitalist is Harish Oneil MD. You were admitted for evaluation and treatment of the above. You were given steroids and breathing treatments as well as antibiotics and tamiflu and your symptoms improved. You are well enough to be discharged. Your meds were changed, please take as prescribed (medication list in this packet). Please follow up with your PCP and your lung doctor (contact details provided).      MEDICATIONS:    It is important that you take the medication exactly as they are prescribed.   Keep your medication in the bottles provided by the pharmacist and keep a list of the medication names, dosages, and times to be taken in your wallet.   Do not take other medications without consulting your doctor.             If you experience any of the following symptoms then please call your primary care physician or return to the emergency room if you cannot get hold of your doctor:  Fever, chills, nausea, vomiting, diarrhea, change in mentation, falling, bleeding, shortness of breath    Follow Up:  Please call the below provider to arrange hospital follow up appointment      Brandt Yousif APRN - NP  [id number] [id number] [phone]    Schedule an appointment as soon as possible for a visit      Brandt Yousif APRN - NP  [id number] [address] [address] [phone]          Candida Hess MD  [address] [place] [address] [phone]    Schedule an appointment as soon as possible for a visit          Information obtained by :  I understand that if any problems occur once I am at home I am

## 2024-02-04 NOTE — SIGNIFICANT EVENT
Notified by RN that pt states she had visual hallucinations since this am when she took Prednisone. Pt states she knows they are not real. Next dose due at 0900. Will place on hold for MD to assess at that time. Nursing will continue to monitor for changes.

## 2024-02-04 NOTE — PROGRESS NOTES
02/04/24 1814   Data Measured Before Walk   HR 79   O2 Saturation 93 RA   O2 Device Room air   Noel Dyspnea Scale 0   Data Measured During the Walk   Heart Rate 88   O2 Saturation 92 RA   Symptoms SOB   Noel Dyspnea Scale 0.5   Data Measured Immediately After Walk   Did Patient Stop or Pause Before 6 Minutes No   Total Distance Walked (m) 200 Meters   Heart Rate 92   O2 Saturation 90   Noel Dyspnea Scale 0.5   Noel Fatigue Scale 0.5   Data Measured 5 Minutes After Walk   Heart Rate 65   O2 Saturation 96 2 LPM   Comments Patient

## 2024-02-04 NOTE — PROGRESS NOTES
[facility]  [address] [place] [address] [phone]        Hospitalist Progress Note      NAME: Karyna Basilio   :  1960  MRM:  [id number]    Date/Time of service: 2024  10:38 AM       Subjective:     Chief Complaint:  Patient was personally seen and examined by me during this time period.  Chart reviewed.  F/up SURYA PNA and flu and COPD exas    Feeling well today without concerns or complaints. Overnight/this morning had some hallucinations but she knew they were hallucinations. Steroids were discontinued. Currently she doesn't report seeing or hearing anything out of the ordinary. Denies SOB and CP       Objective:       Vitals:       Last 24hrs VS reviewed since prior progress note. Most recent are:    Vitals:    24 0731   BP: (!) 159/98   Pulse: 69   Resp:    Temp: 98.4 °F (36.9 °C)   SpO2: 95%     SpO2 Readings from Last 6 Encounters:   24 95%   23 92%   10/16/23 98%   23 92%        No intake or output data in the 24 hours ending 24 1038       Exam:     Physical Exam:    Gen:   in no acute distress  HEENT:  Pink conjunctivae, EOMI, hearing intact to voice, moist mucous membranes  Neck:  Supple, without masses, thyroid non-tender  Resp:  No accessory muscle use, exp wheezing  Card:  No murmurs, normal S1, S2 without thrills, bruits or peripheral edema  Abd:  Soft, non-tender, non-distended  Musc:  No cyanosis or clubbing  Skin:  No rashes or ulcers, skin turgor is good  Neuro:  follows commands appropriately  Psych:  Good insight, oriented to person, place and time, alert      Medications Reviewed: (see below)    Lab Data Reviewed: (see below)    ______________________________________________________________________    Medications:     Current Facility-Administered Medications   Medication Dose Route Frequency    arformoterol tartrate (BROVANA) nebulizer solution 15 mcg  15 mcg Nebulization BID RT    budesonide  (PULMICORT) nebulizer suspension 500 mcg  0.5 mg Nebulization BID RT    ipratropium 0.5 mg-albuterol 2.5 mg (DUONEB) nebulizer solution 1 Dose  1 Dose Inhalation TID RT    melatonin tablet 6 mg  6 mg Oral Nightly PRN    acetaminophen (TYLENOL) tablet 650 mg  650 mg Oral 4 times per day    tetrahydrozoline 0.05 % ophthalmic solution 1 drop  1 drop Both Eyes TID PRN    escitalopram (LEXAPRO) tablet 20 mg  20 mg Oral Daily    gabapentin (NEURONTIN) capsule 300 mg  300 mg Oral Daily    pantoprazole (PROTONIX) tablet 40 mg  40 mg Oral QAM    tiZANidine (ZANAFLEX) tablet 4 mg  4 mg Oral TID PRN    sodium chloride flush 0.9 % injection 5-40 mL  5-40 mL IntraVENous 2 times per day    sodium chloride flush 0.9 % injection 5-40 mL  5-40 mL IntraVENous PRN    0.9 % sodium chloride infusion   IntraVENous PRN    potassium chloride (KLOR-CON) extended release tablet 40 mEq  40 mEq Oral PRN    Or    potassium bicarb-citric acid (EFFER-K) effervescent tablet 40 mEq  40 mEq Oral PRN    Or    potassium chloride 10 mEq/100 mL IVPB (Peripheral Line)  10 mEq IntraVENous PRN    magnesium sulfate 2000 mg in 50 mL IVPB premix  2,000 mg IntraVENous PRN    enoxaparin (LOVENOX) injection 40 mg  40 mg SubCUTAneous Q24H    ondansetron (ZOFRAN-ODT) disintegrating tablet 4 mg  4 mg Oral Q8H PRN    Or    ondansetron (ZOFRAN) injection 4 mg  4 mg IntraVENous Q6H PRN    polyethylene glycol (GLYCOLAX) packet 17 g  17 g Oral Daily PRN    acetaminophen (TYLENOL) tablet 650 mg  650 mg Oral Q6H PRN    Or    acetaminophen (TYLENOL) suppository 650 mg  650 mg Rectal Q6H PRN    oseltamivir (TAMIFLU) capsule 75 mg  75 mg Oral BID    azithromycin (ZITHROMAX) 500 mg in sodium chloride 0.9 % 250 mL IVPB (Jack7Wke)  500 mg IntraVENous Q24H    cefTRIAXone (ROCEPHIN) 1,000 mg in sterile water 10 mL IV syringe  1,000 mg IntraVENous Q24H    benzonatate (TESSALON) capsule 100 mg  100 mg Oral TID PRN    guaiFENesin-dextromethorphan (ROBITUSSIN DM) 100-10 MG/5ML syrup

## 2024-02-05 ENCOUNTER — TELEPHONE (OUTPATIENT)
Facility: CLINIC | Age: 64
End: 2024-02-05

## 2024-02-05 NOTE — TELEPHONE ENCOUNTER
----- Message from Deborah Luz sent at 2/5/2024  9:03 AM EST -----  Subject: Hospital Follow Up    QUESTIONS  What hospital was the Patient Discharged from? [facility]  Date of Discharge? 2024-02-04  Discharge Location? Home  Reason for hospitalization as patient stated? Flu, COPD Flare up.  What question does the patient have, if applicable? Pt needs to schedule   H/F . Please contact her to schedule appt.  ---------------------------------------------------------------------------  --------------  CALL BACK INFO  What is the best way for the office to contact you? OK to leave message on   voicemail  Preferred Call Back Phone Number? [phone]  ---------------------------------------------------------------------------  --------------  SCRIPT ANSWERS  Relationship to Patient? Self

## 2024-02-05 NOTE — PROGRESS NOTES
To whom it may concern,    This patient was admitted on my service at [facility] between 2/1/24 and 2/4/23. Please excuse her from work during this time.    If there are any questions, feel free to reach out.    Best regards,  Harish Oneil MD  Hospitalist  Department of Internal Medicine  [facility]

## 2024-02-06 LAB
BACTERIA SPEC CULT: NORMAL
SERVICE CMNT-IMP: NORMAL

## 2024-02-12 NOTE — PROGRESS NOTES
Physician Progress Note      PATIENT:               ELODIA SCHWARTZ  [facility] #:                  [id number]  :                       1960  ADMIT DATE:       2024 3:43 PM  DISCH DATE:        2024 7:15 PM  RESPONDING  PROVIDER #:        Harish Oneil MD          QUERY TEXT:    Good morning  Patient admitted with Influenza B and pneumonia.  Noted documentation of acute hypoxic respiratory failure in H&P on .    In order to support the diagnosis of acute respiratory failure, please include   additional clinical indicators in your documentation.  Or please document if   the diagnosis of acute respiratory failure has been ruled out after further   study.    The medical record reflects the following:  Risk Factors: age, influenza B, pneumonia, SOB  Clinical Indicators: Patient presented with productive cough, body aches, and   shortness of breath associated with dizziness and lightheadedness. Expiratory   wheezing bilaterally.  Rhonchi on the left.  No accessory muscle use. VS:   98.4, 80, 16, 137/81, 84%. Placed on 4L NC, O2 stats 96%  Venous gas showed: ph 7.33 and pco2 52.9, CT chest:  New groundglass opacities   in the posterior left upper lobe  Treatment: CXR, neb tx, supplemental O2, CT chest, COPD    Thank you  Natividad Ko RN CDI  [phone]    Acute Respiratory Failure Clinical Indicators per [employer]-DR[employer] Training Guide and   Quick Reference Guide:  pO2 < 60 mmHg or SpO2 (pulse oximetry) < 91% breathing room air  pCO2 > 50 and pH < 7.35  P/F ratio (pO2 / FIO2) < 300  pO2 decrease or pCO2 increase by 10 mmHg from baseline (if known)  Supplemental oxygen of 40% or more  Presence of respiratory distress, tachypnea, dyspnea, shortness of breath,   wheezing  Unable to speak in complete sentences  Use of accessory muscles to breathe  Extreme anxiety and feeling of impending doom  Tripod position  Confusion/altered mental status/obtunded  Options provided:  -- Acute Respiratory Failure with

## 2024-02-15 ENCOUNTER — OFFICE VISIT (OUTPATIENT)
Facility: CLINIC | Age: 64
End: 2024-02-15
Payer: MEDICARE

## 2024-02-15 ENCOUNTER — TELEPHONE (OUTPATIENT)
Facility: CLINIC | Age: 64
End: 2024-02-15

## 2024-02-15 VITALS
HEART RATE: 84 BPM | HEIGHT: 64 IN | RESPIRATION RATE: 16 BRPM | DIASTOLIC BLOOD PRESSURE: 62 MMHG | WEIGHT: 154 LBS | OXYGEN SATURATION: 92 % | SYSTOLIC BLOOD PRESSURE: 100 MMHG | BODY MASS INDEX: 26.29 KG/M2

## 2024-02-15 DIAGNOSIS — Z13.1 DIABETES MELLITUS SCREENING: ICD-10-CM

## 2024-02-15 DIAGNOSIS — Z13.0 SCREENING FOR DEFICIENCY ANEMIA: ICD-10-CM

## 2024-02-15 DIAGNOSIS — Z13.228 ENCOUNTER FOR SCREENING FOR OTHER METABOLIC DISORDERS: ICD-10-CM

## 2024-02-15 DIAGNOSIS — Z87.01 HISTORY OF PNEUMONIA: ICD-10-CM

## 2024-02-15 DIAGNOSIS — Z09 HOSPITAL DISCHARGE FOLLOW-UP: Primary | ICD-10-CM

## 2024-02-15 DIAGNOSIS — Z87.09 HISTORY OF INFLUENZA: ICD-10-CM

## 2024-02-15 DIAGNOSIS — K76.0 FATTY LIVER: ICD-10-CM

## 2024-02-15 DIAGNOSIS — Z13.29 THYROID DISORDER SCREEN: ICD-10-CM

## 2024-02-15 DIAGNOSIS — J44.1 COPD EXACERBATION (HCC): ICD-10-CM

## 2024-02-15 DIAGNOSIS — E53.8 B12 DEFICIENCY: ICD-10-CM

## 2024-02-15 DIAGNOSIS — E78.2 MIXED HYPERLIPIDEMIA: ICD-10-CM

## 2024-02-15 PROCEDURE — 1111F DSCHRG MED/CURRENT MED MERGE: CPT | Performed by: NURSE PRACTITIONER

## 2024-02-15 PROCEDURE — G8419 CALC BMI OUT NRM PARAM NOF/U: HCPCS | Performed by: NURSE PRACTITIONER

## 2024-02-15 PROCEDURE — G8484 FLU IMMUNIZE NO ADMIN: HCPCS | Performed by: NURSE PRACTITIONER

## 2024-02-15 PROCEDURE — G8427 DOCREV CUR MEDS BY ELIG CLIN: HCPCS | Performed by: NURSE PRACTITIONER

## 2024-02-15 PROCEDURE — 3017F COLORECTAL CA SCREEN DOC REV: CPT | Performed by: NURSE PRACTITIONER

## 2024-02-15 PROCEDURE — 99214 OFFICE O/P EST MOD 30 MIN: CPT | Performed by: NURSE PRACTITIONER

## 2024-02-15 PROCEDURE — 4004F PT TOBACCO SCREEN RCVD TLK: CPT | Performed by: NURSE PRACTITIONER

## 2024-02-15 PROCEDURE — 3023F SPIROM DOC REV: CPT | Performed by: NURSE PRACTITIONER

## 2024-02-15 RX ORDER — PREDNISONE 1 MG/1
1 TABLET ORAL DAILY
COMMUNITY

## 2024-02-15 NOTE — PROGRESS NOTES
Chief Complaint   Patient presents with    Follow-Up from Hospital     COPD flare up, pneumonia, flu     PHQ-9 Total Score: 2 (2/15/2024  1:04 PM)  Thoughts that you would be better off dead, or of hurting yourself in some way: 0 (2/15/2024  1:04 PM)    \"Have you been to the ER, urgent care clinic since your last visit?  Hospitalized since your last visit?\"    NO    “Have you seen or consulted any other health care providers outside of [facility] since your last visit?”    NO    “Have you had a colorectal cancer screening such as a colonoscopy/FIT/Cologuard?    NO            Wt 69.9 kg (154 lb)   BMI 26.43 kg/m²       8/31/2022     9:12 AM   Amb Fall Risk Assessment and TUG Test   Fall in past 12 months? 0   Able to walk? Yes         
sounds bilaterally  Abdominal:      General: Bowel sounds are normal.      Palpations: Abdomen is soft.   Skin:     General: Skin is warm and dry.   Neurological:      Mental Status: She is alert and oriented to person, place, and time.   Psychiatric:         Mood and Affect: Mood normal.         Behavior: Behavior normal.         Thought Content: Thought content normal.         Judgment: Judgment normal.          Assessment & Plan     Diagnosis Orders   1. Hospital discharge follow-up        2. Mixed hyperlipidemia  Lipid Panel      3. B12 deficiency  Vitamin B12      4. Fatty liver        5. Diabetes mellitus screening        6. Encounter for screening for other metabolic disorders  Comprehensive Metabolic Panel      7. Screening for deficiency anemia  CBC with Auto Differential      8. Thyroid disorder screen  Thyroid Cascade Profile      9. History of influenza        10. COPD exacerbation (HCC)        11. History of pneumonia          Karyna was seen today for follow-up from hospital.    Diagnoses and all orders for this visit:    Hospital discharge follow-up  Patient has followed up with pulmonologist this morning and they have put her on another course of prednisone.  She is to continue her Symbicort and she has an albuterol inhaler for rescue.  She will be adherent with the visits with her pulmonologist and we will support the plan of treatment    Mixed hyperlipidemia  -     Lipid Panel  Obtaining updated lipid panel for trending and will make treatment decisions when I get the results    B12 deficiency  -     Vitamin B12  Obtaining updated vitamin B12 for trending and will make supplementation decisions when I get the results    Fatty liver  CMP for trending and will make treatment decisions when I get the results    Encounter for screening for other metabolic disorders  -     Comprehensive Metabolic Panel  Obtaining updated CMP for trending with special attention to her sodium and will make

## 2024-02-15 NOTE — TELEPHONE ENCOUNTER
Pt while in office stated she would like a call back once the referral for an Endoscopy or Gastro is placed into the system. She said it was discussed during her appointment and she remembered it was out in [place]

## 2024-02-16 DIAGNOSIS — K76.0 FATTY LIVER: Primary | ICD-10-CM

## 2024-02-16 LAB
ALBUMIN SERPL-MCNC: 4.5 G/DL (ref 3.9–4.9)
ALBUMIN/GLOB SERPL: 1.7 {RATIO} (ref 1.2–2.2)
ALP SERPL-CCNC: 62 IU/L (ref 44–121)
ALT SERPL-CCNC: 15 IU/L (ref 0–32)
AST SERPL-CCNC: 21 IU/L (ref 0–40)
BASOPHILS # BLD AUTO: 0 X10E3/UL (ref 0–0.2)
BASOPHILS NFR BLD AUTO: 0 %
BILIRUB SERPL-MCNC: 0.2 MG/DL (ref 0–1.2)
BUN SERPL-MCNC: 10 MG/DL (ref 8–27)
BUN/CREAT SERPL: 11 (ref 12–28)
CALCIUM SERPL-MCNC: 9.8 MG/DL (ref 8.7–10.3)
CHLORIDE SERPL-SCNC: 102 MMOL/L (ref 96–106)
CHOLEST SERPL-MCNC: 245 MG/DL (ref 100–199)
CO2 SERPL-SCNC: 21 MMOL/L (ref 20–29)
CREAT SERPL-MCNC: 0.87 MG/DL (ref 0.57–1)
EGFRCR SERPLBLD CKD-EPI 2021: 76 ML/MIN/1.73
EOSINOPHIL # BLD AUTO: 0.1 X10E3/UL (ref 0–0.4)
EOSINOPHIL NFR BLD AUTO: 1 %
ERYTHROCYTE [DISTWIDTH] IN BLOOD BY AUTOMATED COUNT: 12.1 % (ref 11.7–15.4)
GLOBULIN SER CALC-MCNC: 2.6 G/DL (ref 1.5–4.5)
GLUCOSE SERPL-MCNC: 121 MG/DL (ref 70–99)
HCT VFR BLD AUTO: 44.2 % (ref 34–46.6)
HDLC SERPL-MCNC: 93 MG/DL
HGB BLD-MCNC: 14.7 G/DL (ref 11.1–15.9)
IMM GRANULOCYTES # BLD AUTO: 0 X10E3/UL (ref 0–0.1)
IMM GRANULOCYTES NFR BLD AUTO: 0 %
LDLC SERPL CALC-MCNC: 126 MG/DL (ref 0–99)
LYMPHOCYTES # BLD AUTO: 2.1 X10E3/UL (ref 0.7–3.1)
LYMPHOCYTES NFR BLD AUTO: 29 %
MCH RBC QN AUTO: 32 PG (ref 26.6–33)
MCHC RBC AUTO-ENTMCNC: 33.3 G/DL (ref 31.5–35.7)
MCV RBC AUTO: 96 FL (ref 79–97)
MONOCYTES # BLD AUTO: 0.5 X10E3/UL (ref 0.1–0.9)
MONOCYTES NFR BLD AUTO: 7 %
NEUTROPHILS # BLD AUTO: 4.5 X10E3/UL (ref 1.4–7)
NEUTROPHILS NFR BLD AUTO: 63 %
PLATELET # BLD AUTO: 376 X10E3/UL (ref 150–450)
POTASSIUM SERPL-SCNC: 4.5 MMOL/L (ref 3.5–5.2)
PROT SERPL-MCNC: 7.1 G/DL (ref 6–8.5)
RBC # BLD AUTO: 4.59 X10E6/UL (ref 3.77–5.28)
SODIUM SERPL-SCNC: 142 MMOL/L (ref 134–144)
TRIGL SERPL-MCNC: 154 MG/DL (ref 0–149)
TSH SERPL DL<=0.005 MIU/L-ACNC: 0.98 UIU/ML (ref 0.45–4.5)
VLDLC SERPL CALC-MCNC: 26 MG/DL (ref 5–40)
WBC # BLD AUTO: 7.2 X10E3/UL (ref 3.4–10.8)

## 2024-02-16 NOTE — PROGRESS NOTES
Gastro referral to: [facility] [address], [place] [address]  Hours: Open ? Closes 4:30?PM  Phone: [phone]

## 2024-02-17 LAB — VIT B12 SERPL-MCNC: 489 PG/ML (ref 232–1245)

## 2024-02-24 ENCOUNTER — HOSPITAL ENCOUNTER (EMERGENCY)
Facility: HOSPITAL | Age: 64
Discharge: HOME | End: 2024-02-24
Attending: STUDENT IN AN ORGANIZED HEALTH CARE EDUCATION/TRAINING PROGRAM
Payer: COMMERCIAL

## 2024-02-24 VITALS
WEIGHT: 151 LBS | SYSTOLIC BLOOD PRESSURE: 153 MMHG | TEMPERATURE: 98.4 F | HEIGHT: 64 IN | RESPIRATION RATE: 18 BRPM | BODY MASS INDEX: 25.78 KG/M2 | DIASTOLIC BLOOD PRESSURE: 93 MMHG | HEART RATE: 86 BPM | OXYGEN SATURATION: 96 %

## 2024-02-24 DIAGNOSIS — T23.162A: Primary | ICD-10-CM

## 2024-02-24 PROCEDURE — 16000 INITIAL TREATMENT OF BURN(S): CPT

## 2024-02-24 PROCEDURE — 99283 EMERGENCY DEPT VISIT LOW MDM: CPT

## 2024-02-24 PROCEDURE — 6370000000 HC RX 637 (ALT 250 FOR IP): Performed by: STUDENT IN AN ORGANIZED HEALTH CARE EDUCATION/TRAINING PROGRAM

## 2024-02-24 RX ORDER — ACETAMINOPHEN 500 MG
1000 TABLET ORAL EVERY 6 HOURS PRN
Qty: 80 TABLET | Refills: 0 | Status: SHIPPED | OUTPATIENT
Start: 2024-02-24 | End: 2024-03-05

## 2024-02-24 RX ORDER — ACETAMINOPHEN 500 MG
1000 TABLET ORAL
Status: COMPLETED | OUTPATIENT
Start: 2024-02-24 | End: 2024-02-24

## 2024-02-24 RX ORDER — NAPROXEN 500 MG/1
500 TABLET ORAL 2 TIMES DAILY WITH MEALS
Qty: 20 TABLET | Refills: 0 | Status: SHIPPED | OUTPATIENT
Start: 2024-02-24 | End: 2024-03-05

## 2024-02-24 RX ORDER — OXYCODONE HYDROCHLORIDE 5 MG/1
5-10 TABLET ORAL EVERY 6 HOURS PRN
Qty: 30 TABLET | Refills: 0 | Status: SHIPPED | OUTPATIENT
Start: 2024-02-24 | End: 2024-02-29

## 2024-02-24 RX ORDER — GINSENG 100 MG
CAPSULE ORAL
Qty: 14 G | Refills: 3 | Status: SHIPPED | OUTPATIENT
Start: 2024-02-24 | End: 2024-03-05

## 2024-02-24 RX ORDER — TETANUS AND DIPHTHERIA TOXOIDS ADSORBED 2; 2 [LF]/.5ML; [LF]/.5ML
0.5 INJECTION INTRAMUSCULAR ONCE
Status: DISCONTINUED | OUTPATIENT
Start: 2024-02-24 | End: 2024-02-24 | Stop reason: HOSPADM

## 2024-02-24 RX ORDER — GINSENG 100 MG
CAPSULE ORAL
Status: COMPLETED | OUTPATIENT
Start: 2024-02-24 | End: 2024-02-24

## 2024-02-24 RX ORDER — OXYCODONE HYDROCHLORIDE 5 MG/1
10 TABLET ORAL
Status: COMPLETED | OUTPATIENT
Start: 2024-02-24 | End: 2024-02-24

## 2024-02-24 RX ADMIN — BACITRACIN 1 EACH: 500 OINTMENT TOPICAL at 19:20

## 2024-02-24 RX ADMIN — ACETAMINOPHEN 1000 MG: 500 TABLET ORAL at 19:21

## 2024-02-24 RX ADMIN — OXYCODONE 10 MG: 5 TABLET ORAL at 19:20

## 2024-02-24 ASSESSMENT — PAIN DESCRIPTION - LOCATION
LOCATION: HAND

## 2024-02-24 ASSESSMENT — PAIN SCALES - GENERAL
PAINLEVEL_OUTOF10: 8

## 2024-02-24 ASSESSMENT — PAIN DESCRIPTION - ORIENTATION
ORIENTATION: LEFT
ORIENTATION: LEFT

## 2024-02-24 ASSESSMENT — PAIN DESCRIPTION - DESCRIPTORS: DESCRIPTORS: ACHING;BURNING

## 2024-02-24 ASSESSMENT — PAIN - FUNCTIONAL ASSESSMENT: PAIN_FUNCTIONAL_ASSESSMENT: 0-10

## 2024-02-24 NOTE — ED TRIAGE NOTES
PT ambulatory to ED with reports of burning her L hand at work in hot butter grease, PT L hand is red on thumb, pointer, middle and ring finger. Pt states a 8/10 pain

## 2024-02-25 NOTE — ED NOTES
Discharge instructions reviewed, discharge papers given and pt teaching completed. (4) prescription(s) discussed. Pt instructed to follow up with PCP and Burn Clinic regarding today's visit. Pt also instructed to report to ED if symptoms return, worsen, don't subside, and/or with onset of new symptoms.

## 2024-02-25 NOTE — ED PROVIDER NOTES
for further evaluation.  Otherwise you can follow-up Monday morning at [facility] as a walk-in patient.  Information and discharge paperwork for follow-up.    The patient has been re-evaluated and feeling better. Patient is stable for discharge.  All available radiology and laboratory results have been reviewed with patient and/or available family.  Patient and/or family verbally conveyed their understanding and agreement of the patient's signs, symptoms, diagnosis, treatment and prognosis and additionally agree to follow-up as recommended in the discharge instructions or to return to the Emergency Department should their condition change or worsen prior to their follow-up appointment.  All questions have been answered and patient and/or available family express understanding.      Prescriptions provided to the patient:     Discharge Medication List as of 2/24/2024  8:28 PM        START taking these medications    Details   oxyCODONE (ROXICODONE) 5 MG immediate release tablet Take 1-2 tablets by mouth every 6 hours as needed for Pain for up to 5 days. Intended supply: 5 days. Take lowest dose possible to manage pain Max Daily Amount: 40 mg, Disp-30 tablet, R-0Normal      acetaminophen (TYLENOL) 500 MG tablet Take 2 tablets by mouth every 6 hours as needed for Pain, Disp-80 tablet, R-0Normal      naproxen (NAPROSYN) 500 MG tablet Take 1 tablet by mouth 2 times daily (with meals) for 10 days, Disp-20 tablet, R-0Normal      bacitracin 500 UNIT/GM ointment Apply topically 2 times daily for the next 3 days, Disp-14 g, R-3, Normal              Joon Valero MD   Emergency Medicine Attending Physician        Joon Valero MD  02/25/24 2005

## 2024-02-25 NOTE — DISCHARGE INSTRUCTIONS
You presented to ED with burn to the left thumb and several fingers of the left hand.  Burn is partial-thickness.  Apply bacitracin twice a day to the wound area, cover with nonstick dressing if able.  Take the prescribed narcotic pain medication as needed for pain.  Take Tylenol 1000 mg and naproxen as well for pain control.  If symptoms change or worsen you have difficulty moving your thumb go to to the ED for further evaluation.  Otherwise you can follow-up Monday morning at [facility] [place] as a walk-in patient.  Information and discharge paperwork for follow-up.

## 2024-03-04 ENCOUNTER — TELEPHONE (OUTPATIENT)
Facility: CLINIC | Age: 64
End: 2024-03-04

## 2024-03-04 DIAGNOSIS — F41.8 MIXED ANXIETY AND DEPRESSIVE DISORDER: Primary | ICD-10-CM

## 2024-03-04 RX ORDER — ESCITALOPRAM OXALATE 20 MG/1
20 TABLET ORAL DAILY
Qty: 90 TABLET | Refills: 1 | Status: SHIPPED | OUTPATIENT
Start: 2024-03-04

## 2024-03-04 NOTE — TELEPHONE ENCOUNTER
Future Appointments  Comment  3/4/2024 - 3/4/2026         Date Visit Type Department Provider    10/4/2024 10:30 AM OFFICE VISIT [facility] Idalmis Gonzalez MD   Appointment Notes:    Return in about 6 months (around 8/15/2024) for follow up.

## 2024-03-16 PROBLEM — Z13.29 THYROID DISORDER SCREEN: Status: RESOLVED | Noted: 2022-08-31 | Resolved: 2024-03-16

## 2024-03-16 PROBLEM — Z09 HOSPITAL DISCHARGE FOLLOW-UP: Status: RESOLVED | Noted: 2024-02-15 | Resolved: 2024-03-16

## 2024-04-10 ENCOUNTER — TELEPHONE (OUTPATIENT)
Facility: CLINIC | Age: 64
End: 2024-04-10

## 2024-04-10 NOTE — TELEPHONE ENCOUNTER
[facility] called in regards to needing a correct fax number to send over a form to  due to pt recently switching to [facility] and needing verification from the provider

## 2024-05-20 ENCOUNTER — APPOINTMENT (OUTPATIENT)
Facility: HOSPITAL | Age: 64
End: 2024-05-20
Payer: MEDICARE

## 2024-05-20 ENCOUNTER — HOSPITAL ENCOUNTER (EMERGENCY)
Facility: HOSPITAL | Age: 64
Discharge: HOME | End: 2024-05-20
Attending: EMERGENCY MEDICINE
Payer: MEDICARE

## 2024-05-20 VITALS
BODY MASS INDEX: 26.8 KG/M2 | WEIGHT: 157 LBS | RESPIRATION RATE: 16 BRPM | HEART RATE: 71 BPM | SYSTOLIC BLOOD PRESSURE: 185 MMHG | OXYGEN SATURATION: 99 % | TEMPERATURE: 98.4 F | HEIGHT: 64 IN | DIASTOLIC BLOOD PRESSURE: 99 MMHG

## 2024-05-20 DIAGNOSIS — J44.1 COPD EXACERBATION (HCC): Primary | ICD-10-CM

## 2024-05-20 LAB
ALBUMIN SERPL-MCNC: 4.3 G/DL (ref 3.5–5.2)
ALBUMIN/GLOB SERPL: 1.5 (ref 1.1–2.2)
ALP SERPL-CCNC: 52 U/L (ref 35–104)
ALT SERPL-CCNC: 16 U/L (ref 10–35)
ANION GAP SERPL CALC-SCNC: 13 MMOL/L (ref 5–15)
AST SERPL-CCNC: 20 U/L (ref 10–35)
BASOPHILS # BLD: 0 K/UL (ref 0–1)
BASOPHILS NFR BLD: 0 % (ref 0–1)
BILIRUB SERPL-MCNC: 0.4 MG/DL (ref 0.2–1)
BUN SERPL-MCNC: 16 MG/DL (ref 8–23)
BUN/CREAT SERPL: 25 (ref 12–20)
CALCIUM SERPL-MCNC: 9.4 MG/DL (ref 8.8–10.2)
CHLORIDE SERPL-SCNC: 99 MMOL/L (ref 98–107)
CO2 SERPL-SCNC: 24 MMOL/L (ref 22–29)
COMMENT:: NORMAL
CREAT SERPL-MCNC: 0.65 MG/DL (ref 0.5–0.9)
DIFFERENTIAL METHOD BLD: ABNORMAL
EKG ATRIAL RATE: 70 BPM
EKG DIAGNOSIS: NORMAL
EKG P AXIS: 79 DEGREES
EKG P-R INTERVAL: 138 MS
EKG Q-T INTERVAL: 430 MS
EKG QRS DURATION: 76 MS
EKG QTC CALCULATION (BAZETT): 464 MS
EKG R AXIS: 74 DEGREES
EKG T AXIS: 58 DEGREES
EKG VENTRICULAR RATE: 70 BPM
EOSINOPHIL # BLD: 0.1 K/UL (ref 0–0.4)
EOSINOPHIL NFR BLD: 1 %
ERYTHROCYTE [DISTWIDTH] IN BLOOD BY AUTOMATED COUNT: 13 % (ref 11.5–14.5)
GLOBULIN SER CALC-MCNC: 2.8 G/DL (ref 2–4)
GLUCOSE SERPL-MCNC: 113 MG/DL (ref 65–100)
HCT VFR BLD AUTO: 41.9 % (ref 35–47)
HGB BLD-MCNC: 14.3 G/DL (ref 11.5–16)
IMM GRANULOCYTES # BLD AUTO: 0 K/UL (ref 0–0.04)
IMM GRANULOCYTES NFR BLD AUTO: 0 % (ref 0–0.5)
LYMPHOCYTES # BLD: 1.2 K/UL (ref 0.8–3.5)
LYMPHOCYTES NFR BLD: 13 % (ref 12–49)
MCH RBC QN AUTO: 34.3 PG (ref 26–34)
MCHC RBC AUTO-ENTMCNC: 34.1 G/DL (ref 30–36.5)
MCV RBC AUTO: 100.5 FL (ref 80–99)
MONOCYTES # BLD: 0.5 K/UL (ref 0–1)
MONOCYTES NFR BLD: 5 % (ref 5–13)
NEUTS SEG # BLD: 7.5 K/UL (ref 1.8–8)
NEUTS SEG NFR BLD: 81 % (ref 32–75)
NRBC # BLD: 0 K/UL (ref 0–0.01)
NRBC BLD-RTO: 0 PER 100 WBC
PLATELET # BLD AUTO: 222 K/UL (ref 150–400)
PMV BLD AUTO: 9.6 FL (ref 8.9–12.9)
POTASSIUM SERPL-SCNC: 4 MMOL/L (ref 3.5–5.1)
PROT SERPL-MCNC: 7.1 G/DL (ref 6.4–8.3)
RBC # BLD AUTO: 4.17 M/UL (ref 3.8–5.2)
SODIUM SERPL-SCNC: 136 MMOL/L (ref 136–145)
SPECIMEN HOLD: NORMAL
WBC # BLD AUTO: 9.3 K/UL (ref 3.6–11)

## 2024-05-20 PROCEDURE — 6370000000 HC RX 637 (ALT 250 FOR IP): Performed by: EMERGENCY MEDICINE

## 2024-05-20 PROCEDURE — 85025 COMPLETE CBC W/AUTO DIFF WBC: CPT

## 2024-05-20 PROCEDURE — 93005 ELECTROCARDIOGRAM TRACING: CPT | Performed by: EMERGENCY MEDICINE

## 2024-05-20 PROCEDURE — 80053 COMPREHEN METABOLIC PANEL: CPT

## 2024-05-20 PROCEDURE — 96375 TX/PRO/DX INJ NEW DRUG ADDON: CPT

## 2024-05-20 PROCEDURE — 6360000002 HC RX W HCPCS: Performed by: EMERGENCY MEDICINE

## 2024-05-20 PROCEDURE — 71046 X-RAY EXAM CHEST 2 VIEWS: CPT

## 2024-05-20 PROCEDURE — 36415 COLL VENOUS BLD VENIPUNCTURE: CPT

## 2024-05-20 PROCEDURE — 96374 THER/PROPH/DIAG INJ IV PUSH: CPT

## 2024-05-20 PROCEDURE — 99285 EMERGENCY DEPT VISIT HI MDM: CPT

## 2024-05-20 PROCEDURE — 93010 ELECTROCARDIOGRAM REPORT: CPT | Performed by: INTERNAL MEDICINE

## 2024-05-20 RX ORDER — IPRATROPIUM BROMIDE AND ALBUTEROL SULFATE 2.5; .5 MG/3ML; MG/3ML
1 SOLUTION RESPIRATORY (INHALATION)
Status: COMPLETED | OUTPATIENT
Start: 2024-05-20 | End: 2024-05-20

## 2024-05-20 RX ORDER — ONDANSETRON 2 MG/ML
4 INJECTION INTRAMUSCULAR; INTRAVENOUS ONCE
Status: COMPLETED | OUTPATIENT
Start: 2024-05-20 | End: 2024-05-20

## 2024-05-20 RX ORDER — ALBUTEROL SULFATE 90 UG/1
2 AEROSOL, METERED RESPIRATORY (INHALATION) 4 TIMES DAILY PRN
Qty: 18 G | Refills: 5 | Status: SHIPPED | OUTPATIENT
Start: 2024-05-20

## 2024-05-20 RX ORDER — DEXAMETHASONE SODIUM PHOSPHATE 10 MG/ML
10 INJECTION, SOLUTION INTRAMUSCULAR; INTRAVENOUS
Status: COMPLETED | OUTPATIENT
Start: 2024-05-20 | End: 2024-05-20

## 2024-05-20 RX ADMIN — ONDANSETRON 4 MG: 2 INJECTION INTRAMUSCULAR; INTRAVENOUS at 11:52

## 2024-05-20 RX ADMIN — IPRATROPIUM BROMIDE AND ALBUTEROL SULFATE 1 DOSE: .5; 3 SOLUTION RESPIRATORY (INHALATION) at 11:52

## 2024-05-20 RX ADMIN — DEXAMETHASONE SODIUM PHOSPHATE 10 MG: 10 INJECTION, SOLUTION INTRAMUSCULAR; INTRAVENOUS at 11:52

## 2024-05-20 ASSESSMENT — ENCOUNTER SYMPTOMS
VOMITING: 0
EYE DISCHARGE: 0
NAUSEA: 0
CHEST TIGHTNESS: 0
EYE PAIN: 0
SORE THROAT: 0
DIARRHEA: 0
FACIAL SWELLING: 0
SHORTNESS OF BREATH: 1
ABDOMINAL PAIN: 0
BACK PAIN: 0
COUGH: 0
WHEEZING: 0

## 2024-05-20 ASSESSMENT — PAIN - FUNCTIONAL ASSESSMENT: PAIN_FUNCTIONAL_ASSESSMENT: NONE - DENIES PAIN

## 2024-05-20 NOTE — ED TRIAGE NOTES
Pt arrives to ER POV c/o shortness of breath and nausea since Thursday after seeing her pulmonologist. Pt reports she is on 2L of O2 at night. Pt dx with COPD, was put on antibiotic on Thursday \"left lung did not sound good.\"

## 2024-05-20 NOTE — ED PROVIDER NOTES
[facility] EMERGENCY DEPT  EMERGENCY DEPARTMENT ENCOUNTER      Pt Name: Karyna Basilio  MRN: [id number]  Birthdate 1960  Date of evaluation: 5/20/2024  Provider: Subhash Null MD    CHIEF COMPLAINT       Chief Complaint   Patient presents with    Shortness of Breath         HISTORY OF PRESENT ILLNESS   (Location/Symptom, Timing/Onset, Context/Setting, Quality, Duration, Modifying Factors, Severity)  Note limiting factors.   63-year-old female with a history of COPD presents today with shortness of breath is a little worse than usual.  She uses oxygen 2 L nasal cannula at bedtime.  She has been using inhalers though not nebulizer and is currently on prednisone 10 mg daily for a week and Omnicef for the past 3 days of a 10-day course.  No chest pain.  No fever.  No sore throat or ear pain.    The history is provided by the patient.   Shortness of Breath  Severity:  Mild  Onset quality:  Gradual  Duration:  5 days  Timing:  Constant  Progression:  Unchanged  Chronicity:  Recurrent  Context: not activity, not animal exposure, not emotional upset, not occupational exposure and not pollens    Relieved by:  Nothing  Worsened by:  Nothing  Ineffective treatments:  Inhaler  Associated symptoms: no abdominal pain, no chest pain, no cough, no diaphoresis, no ear pain, no fever, no headaches, no PND, no rash, no sore throat, no syncope, no vomiting and no wheezing          Review of External Medical Records:     Nursing Notes were reviewed.    REVIEW OF SYSTEMS    (2-9 systems for level 4, 10 or more for level 5)     Review of Systems   Constitutional:  Negative for activity change, appetite change, chills, diaphoresis and fever.   HENT:  Negative for congestion, ear pain, facial swelling and sore throat.    Eyes:  Negative for pain, discharge and visual disturbance.   Respiratory:  Positive for shortness of breath. Negative for cough, chest tightness and wheezing.    Cardiovascular:  Negative for chest pain,

## 2024-05-20 NOTE — ED NOTES
Patient does not appear to be in any acute distress/shows no evidence of clinical instability at this time.     Reviewed discharge instructions, prescriptions, education and follow up information with patient.     Patient verbalizing understanding. Patient at baseline cardiac, respiratory, and neuro function. Pain controlled.     Patient left ER under baseline transfer modality.

## 2024-05-21 ENCOUNTER — TELEPHONE (OUTPATIENT)
Facility: CLINIC | Age: 64
End: 2024-05-21

## 2024-05-21 NOTE — TELEPHONE ENCOUNTER
Patient is needing a pre op for let eye cataract surgery on 7/1/24. No available appointments. Possible work in?

## 2024-05-24 DIAGNOSIS — F41.8 MIXED ANXIETY AND DEPRESSIVE DISORDER: ICD-10-CM

## 2024-05-24 RX ORDER — ESCITALOPRAM OXALATE 20 MG/1
20 TABLET ORAL DAILY
Qty: 90 TABLET | Refills: 1 | Status: SHIPPED | OUTPATIENT
Start: 2024-05-24

## 2024-05-24 NOTE — TELEPHONE ENCOUNTER
Future Appointments  5/24/2024 - 5/24/2026   Date Visit Type Department Provider    6/25/2024 11:30 AM PRE OP [facility] Idalmis Gregory MD   Appointment Notes:   pre op - left eye cataract surgery, per [facility]WE           10/4/2024 10:30 AM OFFICE VISIT [facility] Idalmis Gregory MD   Appointment Notes:   Return in about 6 months (around 8/15/2024) for follow up.

## 2024-06-25 ENCOUNTER — TELEPHONE (OUTPATIENT)
Facility: CLINIC | Age: 64
End: 2024-06-25

## 2024-06-25 NOTE — TELEPHONE ENCOUNTER
Pt was scheduled today at 11:30 and no showed appointment, please contact patient to schedule appointment with any available provider.

## 2024-06-25 NOTE — TELEPHONE ENCOUNTER
----- Message from Bailey Carrasco sent at 6/25/2024  1:27 PM EDT -----  Regarding: [facility] Appointment Request  [facility] Appointment Request    Patient needs appointment for [facility] Appointment Type: Pre-Op Visit.    Reason for Appointment Request: Available appointments did not meet patient need: The patient is requesting to have an appointment with her PCP Idalmis Gonzalez MD for a  Pre-Op visit for left eye cataract surgery as soon as possible except on Friday.  .  --------------------------------------------------------------------------------------------------------------------------    Relationship to Patient: Self     Call Back Information: OK to leave message on voicemail  Preferred Call Back Number: Phone  [phone]

## 2024-08-23 RX ORDER — OMEPRAZOLE 20 MG/1
20 CAPSULE, DELAYED RELEASE ORAL 2 TIMES DAILY
Qty: 180 CAPSULE | Refills: 1 | Status: SHIPPED | OUTPATIENT
Start: 2024-08-23

## 2024-09-06 ENCOUNTER — APPOINTMENT (OUTPATIENT)
Facility: HOSPITAL | Age: 64
End: 2024-09-06
Payer: MEDICARE

## 2024-09-06 ENCOUNTER — HOSPITAL ENCOUNTER (EMERGENCY)
Facility: HOSPITAL | Age: 64
Discharge: HOME | End: 2024-09-06
Attending: EMERGENCY MEDICINE
Payer: MEDICARE

## 2024-09-06 VITALS
RESPIRATION RATE: 13 BRPM | WEIGHT: 160 LBS | TEMPERATURE: 98.5 F | HEIGHT: 64 IN | HEART RATE: 71 BPM | SYSTOLIC BLOOD PRESSURE: 152 MMHG | DIASTOLIC BLOOD PRESSURE: 82 MMHG | BODY MASS INDEX: 27.31 KG/M2 | OXYGEN SATURATION: 95 %

## 2024-09-06 DIAGNOSIS — J40 BRONCHITIS: Primary | ICD-10-CM

## 2024-09-06 LAB
ALBUMIN SERPL-MCNC: 4.4 G/DL (ref 3.5–5.2)
ALBUMIN/GLOB SERPL: 1.7 (ref 1.1–2.2)
ALP SERPL-CCNC: 66 U/L (ref 35–104)
ALT SERPL-CCNC: 30 U/L (ref 10–35)
ANION GAP SERPL CALC-SCNC: 15 MMOL/L (ref 2–12)
AST SERPL-CCNC: 25 U/L (ref 10–35)
BASOPHILS # BLD: 0 K/UL (ref 0–1)
BASOPHILS NFR BLD: 0 % (ref 0–1)
BILIRUB SERPL-MCNC: 0.3 MG/DL (ref 0.2–1)
BUN SERPL-MCNC: 21 MG/DL (ref 8–23)
BUN/CREAT SERPL: 28 (ref 12–20)
CALCIUM SERPL-MCNC: 9.4 MG/DL (ref 8.8–10.2)
CHLORIDE SERPL-SCNC: 97 MMOL/L (ref 98–107)
CO2 SERPL-SCNC: 25 MMOL/L (ref 22–29)
COMMENT:: NORMAL
CREAT SERPL-MCNC: 0.76 MG/DL (ref 0.5–0.9)
DIFFERENTIAL METHOD BLD: ABNORMAL
EOSINOPHIL # BLD: 0.1 K/UL (ref 0–0.4)
EOSINOPHIL NFR BLD: 0 %
ERYTHROCYTE [DISTWIDTH] IN BLOOD BY AUTOMATED COUNT: 13.9 % (ref 11.5–14.5)
GLOBULIN SER CALC-MCNC: 2.6 G/DL (ref 2–4)
GLUCOSE SERPL-MCNC: 148 MG/DL (ref 65–100)
HCT VFR BLD AUTO: 41.9 % (ref 35–47)
HGB BLD-MCNC: 13.6 G/DL (ref 11.5–16)
IMM GRANULOCYTES # BLD AUTO: 0.1 K/UL (ref 0–0.04)
IMM GRANULOCYTES NFR BLD AUTO: 1 % (ref 0–0.5)
LIPASE SERPL-CCNC: 33 U/L (ref 13–60)
LYMPHOCYTES # BLD: 1.3 K/UL (ref 0.8–3.5)
LYMPHOCYTES NFR BLD: 9 % (ref 12–49)
MCH RBC QN AUTO: 33.4 PG (ref 26–34)
MCHC RBC AUTO-ENTMCNC: 32.5 G/DL (ref 30–36.5)
MCV RBC AUTO: 102.9 FL (ref 80–99)
MONOCYTES # BLD: 0.6 K/UL (ref 0–1)
MONOCYTES NFR BLD: 4 % (ref 5–13)
NEUTS SEG # BLD: 12.9 K/UL (ref 1.8–8)
NEUTS SEG NFR BLD: 86 % (ref 32–75)
NRBC # BLD: 0 K/UL (ref 0–0.01)
NRBC BLD-RTO: 0 PER 100 WBC
PLATELET # BLD AUTO: 260 K/UL (ref 150–400)
PMV BLD AUTO: 9.8 FL (ref 8.9–12.9)
POTASSIUM SERPL-SCNC: 4.1 MMOL/L (ref 3.5–5.1)
PROT SERPL-MCNC: 7 G/DL (ref 6.4–8.3)
RBC # BLD AUTO: 4.07 M/UL (ref 3.8–5.2)
SODIUM SERPL-SCNC: 137 MMOL/L (ref 136–145)
SPECIMEN HOLD: NORMAL
TROPONIN T SERPL HS-MCNC: 12.5 NG/L (ref 0–14)
TROPONIN T SERPL HS-MCNC: 9.9 NG/L (ref 0–14)
WBC # BLD AUTO: 14.9 K/UL (ref 3.6–11)

## 2024-09-06 PROCEDURE — 36415 COLL VENOUS BLD VENIPUNCTURE: CPT

## 2024-09-06 PROCEDURE — 93005 ELECTROCARDIOGRAM TRACING: CPT | Performed by: EMERGENCY MEDICINE

## 2024-09-06 PROCEDURE — 99285 EMERGENCY DEPT VISIT HI MDM: CPT

## 2024-09-06 PROCEDURE — 80053 COMPREHEN METABOLIC PANEL: CPT

## 2024-09-06 PROCEDURE — 71046 X-RAY EXAM CHEST 2 VIEWS: CPT

## 2024-09-06 PROCEDURE — 83690 ASSAY OF LIPASE: CPT

## 2024-09-06 PROCEDURE — 85025 COMPLETE CBC W/AUTO DIFF WBC: CPT

## 2024-09-06 PROCEDURE — 84484 ASSAY OF TROPONIN QUANT: CPT

## 2024-09-06 ASSESSMENT — PAIN SCALES - GENERAL: PAINLEVEL_OUTOF10: 0

## 2024-09-06 ASSESSMENT — ENCOUNTER SYMPTOMS
COLOR CHANGE: 0
BACK PAIN: 0
VOMITING: 0
NAUSEA: 1
SHORTNESS OF BREATH: 1
DIARRHEA: 0
ABDOMINAL PAIN: 0
CONSTIPATION: 0

## 2024-09-06 ASSESSMENT — PAIN SCALES - WONG BAKER: WONGBAKER_NUMERICALRESPONSE: NO HURT

## 2024-09-06 ASSESSMENT — PAIN - FUNCTIONAL ASSESSMENT: PAIN_FUNCTIONAL_ASSESSMENT: 0-10

## 2024-09-06 NOTE — ED PROVIDER NOTES
Psychiatric/Behavioral:  Negative for behavioral problems, confusion, hallucinations, self-injury and suicidal ideas.        Except as noted above the remainder of the review of systems was reviewed and negative.       PAST MEDICAL HISTORY     Past Medical History:   Diagnosis Date    Anxiety and depression     COPD (chronic obstructive pulmonary disease) (HCC)     COPD exacerbation (HCC) 10/2022    High cholesterol     Hypertension          SURGICAL HISTORY       Past Surgical History:   Procedure Laterality Date    APPENDECTOMY  1982     SECTION      x 2    COLONOSCOPY  2012    repeat in 5 years    HYSTERECTOMY (CERVIX STATUS UNKNOWN)      HYSTERECTOMY, TOTAL ABDOMINAL (CERVIX REMOVED)  2005    JOINT REPLACEMENT  2004    ORTHOPEDIC SURGERY Left     release of ulnar nerve     OVARY REMOVAL      TOTAL HIP ARTHROPLASTY Left     TOTAL HIP ARTHROPLASTY Right          CURRENT MEDICATIONS       Previous Medications    ACETAMINOPHEN (TYLENOL) 500 MG TABLET    Take 2 tablets by mouth every 6 hours as needed for Pain    ALBUTEROL (PROVENTIL) (2.5 MG/3ML) 0.083% NEBULIZER SOLUTION    Inhale 3 mLs into the lungs every 4 hours as needed    ALBUTEROL SULFATE HFA (PROVENTIL;VENTOLIN;PROAIR) 108 (90 BASE) MCG/ACT INHALER    Inhale 2 puffs into the lungs every 4 hours as needed for Wheezing    ALBUTEROL SULFATE HFA (VENTOLIN HFA) 108 (90 BASE) MCG/ACT INHALER    Inhale 2 puffs into the lungs 4 times daily as needed for Wheezing    BUDESONIDE-FORMOTEROL (SYMBICORT) 160-4.5 MCG/ACT AERO    Inhale 2 puffs into the lungs 2 times daily    DICLOFENAC (VOLTAREN) 75 MG EC TABLET    Take 1 tablet by mouth as needed for Pain    ESCITALOPRAM (LEXAPRO) 20 MG TABLET    TAKE 1 TABLET BY MOUTH EVERY DAY    GABAPENTIN (NEURONTIN) 300 MG CAPSULE    Take 1 capsule by mouth daily.    NAPROXEN (NAPROSYN) 500 MG TABLET    Take 1 tablet by mouth 2 times daily (with meals) for 10 days    OMEPRAZOLE (PRILOSEC) 20 MG DELAYED RELEASE CAPSULE     Physician           Harjinder Preston MD  09/07/24 0469

## 2024-09-06 NOTE — ED NOTES
Patient updated on plan of care, and given opportunity to ask questions. Patient verbalizes understanding. Patient is currently resting, no needs expressed at this time. Bed is in the low position, wheels are locked, and call bell is within reach.

## 2024-09-06 NOTE — ED TRIAGE NOTES
Patient arrives in the ED ambulatory, alert and oriented x 4, breaths even and unlabored and in no acute distress with complaints of intermittent chest pain with SOB and dizziness starting yesterday.  Pt reports productive cough x several weeks with history of COPD.  Reports generalized weakness.

## 2024-09-07 LAB
EKG ATRIAL RATE: 81 BPM
EKG DIAGNOSIS: NORMAL
EKG P AXIS: 72 DEGREES
EKG P-R INTERVAL: 100 MS
EKG Q-T INTERVAL: 404 MS
EKG QRS DURATION: 92 MS
EKG QTC CALCULATION (BAZETT): 469 MS
EKG R AXIS: -24 DEGREES
EKG T AXIS: 82 DEGREES
EKG VENTRICULAR RATE: 81 BPM

## 2024-09-07 PROCEDURE — 93010 ELECTROCARDIOGRAM REPORT: CPT | Performed by: SPECIALIST

## 2024-10-04 ENCOUNTER — OFFICE VISIT (OUTPATIENT)
Facility: CLINIC | Age: 64
End: 2024-10-04
Payer: MEDICARE

## 2024-10-04 VITALS
WEIGHT: 172 LBS | HEART RATE: 76 BPM | DIASTOLIC BLOOD PRESSURE: 76 MMHG | TEMPERATURE: 97.4 F | HEIGHT: 64 IN | BODY MASS INDEX: 29.37 KG/M2 | OXYGEN SATURATION: 98 % | SYSTOLIC BLOOD PRESSURE: 130 MMHG

## 2024-10-04 DIAGNOSIS — B35.3 TINEA PEDIS OF LEFT FOOT: ICD-10-CM

## 2024-10-04 DIAGNOSIS — R07.9 CHEST PAIN, UNSPECIFIED TYPE: ICD-10-CM

## 2024-10-04 DIAGNOSIS — J44.9 CHRONIC OBSTRUCTIVE PULMONARY DISEASE, UNSPECIFIED COPD TYPE (HCC): Primary | ICD-10-CM

## 2024-10-04 DIAGNOSIS — M15.0 PRIMARY OSTEOARTHRITIS INVOLVING MULTIPLE JOINTS: ICD-10-CM

## 2024-10-04 DIAGNOSIS — F41.8 MIXED ANXIETY AND DEPRESSIVE DISORDER: ICD-10-CM

## 2024-10-04 DIAGNOSIS — E78.2 MIXED HYPERLIPIDEMIA: ICD-10-CM

## 2024-10-04 DIAGNOSIS — R73.01 IMPAIRED FASTING GLUCOSE: ICD-10-CM

## 2024-10-04 PROCEDURE — G8419 CALC BMI OUT NRM PARAM NOF/U: HCPCS | Performed by: FAMILY MEDICINE

## 2024-10-04 PROCEDURE — G8484 FLU IMMUNIZE NO ADMIN: HCPCS | Performed by: FAMILY MEDICINE

## 2024-10-04 PROCEDURE — 99215 OFFICE O/P EST HI 40 MIN: CPT | Performed by: FAMILY MEDICINE

## 2024-10-04 PROCEDURE — 4004F PT TOBACCO SCREEN RCVD TLK: CPT | Performed by: FAMILY MEDICINE

## 2024-10-04 PROCEDURE — 3023F SPIROM DOC REV: CPT | Performed by: FAMILY MEDICINE

## 2024-10-04 PROCEDURE — G8427 DOCREV CUR MEDS BY ELIG CLIN: HCPCS | Performed by: FAMILY MEDICINE

## 2024-10-04 PROCEDURE — 3017F COLORECTAL CA SCREEN DOC REV: CPT | Performed by: FAMILY MEDICINE

## 2024-10-04 RX ORDER — TIOTROPIUM BROMIDE INHALATION SPRAY 3.12 UG/1
SPRAY, METERED RESPIRATORY (INHALATION)
COMMUNITY
Start: 2024-08-27

## 2024-10-04 RX ORDER — GABAPENTIN 300 MG/1
300 CAPSULE ORAL DAILY
Qty: 90 CAPSULE | Status: CANCELLED | OUTPATIENT
Start: 2024-10-04

## 2024-10-04 RX ORDER — ALBUTEROL SULFATE 0.83 MG/ML
2.5 SOLUTION RESPIRATORY (INHALATION) EVERY 4 HOURS PRN
Qty: 120 EACH | Refills: 1 | Status: SHIPPED | OUTPATIENT
Start: 2024-10-04

## 2024-10-04 RX ORDER — PRENATAL VIT 91/IRON/FOLIC/DHA 28-975-200
COMBINATION PACKAGE (EA) ORAL
Qty: 15 G | Refills: 1 | Status: SHIPPED | OUTPATIENT
Start: 2024-10-04

## 2024-10-04 NOTE — PROGRESS NOTES
Chief Complaint   Patient presents with    Follow-up Chronic Condition     \"Have you been to the ER, urgent care clinic since your last visit?  Hospitalized since your last visit?\"    YES, [facility] for Bronchitis    “Have you seen or consulted any other health care providers outside of [facility] since your last visit?”    NO     Temp 97.4 °F (36.3 °C) (Temporal)   Ht 1.626 m (5' 4\")   Wt 78 kg (172 lb)   BMI 29.52 kg/m²    No data recorded       No questionnaires available.                              “Have you had a colorectal cancer screening such as a colonoscopy/FIT/Cologuard?    NO    No colonoscopy on file  No cologuard on file  No FIT/FOBT on file   No flexible sigmoidoscopy on file         Click Here for Release of Records Request     Identified Patient with 2 Patient Identifiers-Name and   
options, and 10 minutes documenting.     Discussed diagnoses in detail with patient.   Medication risks/benefits/side effects discussed with patient.   All of the patient's questions were addressed. The patient understands and agrees with our plan of care.  The patient knows to call back if they are unsure of or forget any changes we discussed today or if the symptoms change.  The patient received an After-Visit Summary which contains VS, orders, medication list and allergy list. This can be used as a \"mini-medical record\" should they have to seek medical care while out of town.    Current Outpatient Medications on File Prior to Visit   Medication Sig Dispense Refill    SPIRIVA RESPIMAT 2.5 MCG/ACT AERS inhaler INHALE 2 PUFFS ONCE DAILY      omeprazole (PRILOSEC) 20 MG delayed release capsule Take 1 capsule by mouth in the morning and at bedtime 180 capsule 1    escitalopram (LEXAPRO) 20 MG tablet TAKE 1 TABLET BY MOUTH EVERY DAY 90 tablet 1    albuterol sulfate HFA (VENTOLIN HFA) 108 (90 Base) MCG/ACT inhaler Inhale 2 puffs into the lungs 4 times daily as needed for Wheezing 18 g 5    budesonide-formoterol (SYMBICORT) 160-4.5 MCG/ACT AERO Inhale 2 puffs into the lungs 2 times daily 30.6 g 1    diclofenac (VOLTAREN) 75 MG EC tablet Take 1 tablet by mouth 2 times daily      acetaminophen (TYLENOL) 500 MG tablet Take 2 tablets by mouth every 6 hours as needed for Pain 80 tablet 0    naproxen (NAPROSYN) 500 MG tablet Take 1 tablet by mouth 2 times daily (with meals) for 10 days 20 tablet 0    predniSONE (DELTASONE) 1 MG tablet Take 1 tablet by mouth daily (Patient not taking: Reported on 10/4/2024)      gabapentin (NEURONTIN) 300 MG capsule Take 1 capsule by mouth daily. (Patient not taking: Reported on 10/4/2024)       No current facility-administered medications on file prior to visit.

## 2024-10-05 LAB
CHOLEST SERPL-MCNC: 204 MG/DL (ref 100–199)
ERYTHROCYTE [DISTWIDTH] IN BLOOD BY AUTOMATED COUNT: 11.9 % (ref 11.7–15.4)
HBA1C MFR BLD: 5.6 % (ref 4.8–5.6)
HCT VFR BLD AUTO: 42.6 % (ref 34–46.6)
HDLC SERPL-MCNC: 104 MG/DL
HGB BLD-MCNC: 13.8 G/DL (ref 11.1–15.9)
LDLC SERPL CALC-MCNC: 81 MG/DL (ref 0–99)
MCH RBC QN AUTO: 33.9 PG (ref 26.6–33)
MCHC RBC AUTO-ENTMCNC: 32.4 G/DL (ref 31.5–35.7)
MCV RBC AUTO: 105 FL (ref 79–97)
PLATELET # BLD AUTO: 280 X10E3/UL (ref 150–450)
RBC # BLD AUTO: 4.07 X10E6/UL (ref 3.77–5.28)
TRIGL SERPL-MCNC: 110 MG/DL (ref 0–149)
TSH SERPL DL<=0.005 MIU/L-ACNC: 2.04 UIU/ML (ref 0.45–4.5)
VLDLC SERPL CALC-MCNC: 19 MG/DL (ref 5–40)
WBC # BLD AUTO: 7.2 X10E3/UL (ref 3.4–10.8)

## 2024-11-14 DIAGNOSIS — F41.8 MIXED ANXIETY AND DEPRESSIVE DISORDER: ICD-10-CM

## 2024-11-14 RX ORDER — ESCITALOPRAM OXALATE 20 MG/1
20 TABLET ORAL DAILY
Qty: 90 TABLET | Refills: 1 | Status: SHIPPED | OUTPATIENT
Start: 2024-11-14

## 2025-01-14 ENCOUNTER — OFFICE VISIT (OUTPATIENT)
Age: 65
End: 2025-01-14
Payer: MEDICARE

## 2025-01-14 VITALS
HEIGHT: 64 IN | BODY MASS INDEX: 31.07 KG/M2 | WEIGHT: 182 LBS | OXYGEN SATURATION: 97 % | DIASTOLIC BLOOD PRESSURE: 84 MMHG | HEART RATE: 80 BPM | SYSTOLIC BLOOD PRESSURE: 132 MMHG

## 2025-01-14 DIAGNOSIS — R06.02 SHORTNESS OF BREATH: Primary | ICD-10-CM

## 2025-01-14 DIAGNOSIS — R07.9 CHEST PAIN, UNSPECIFIED TYPE: ICD-10-CM

## 2025-01-14 DIAGNOSIS — F17.200 TOBACCO DEPENDENCE: ICD-10-CM

## 2025-01-14 DIAGNOSIS — R94.31 ABNORMAL EKG: ICD-10-CM

## 2025-01-14 DIAGNOSIS — G24.5 EYE TWITCH: ICD-10-CM

## 2025-01-14 DIAGNOSIS — E78.2 MIXED HYPERLIPIDEMIA: ICD-10-CM

## 2025-01-14 DIAGNOSIS — J44.9 CHRONIC OBSTRUCTIVE PULMONARY DISEASE, UNSPECIFIED COPD TYPE (HCC): ICD-10-CM

## 2025-01-14 PROCEDURE — 99204 OFFICE O/P NEW MOD 45 MIN: CPT | Performed by: SPECIALIST

## 2025-01-14 PROCEDURE — 4004F PT TOBACCO SCREEN RCVD TLK: CPT | Performed by: SPECIALIST

## 2025-01-14 PROCEDURE — G8417 CALC BMI ABV UP PARAM F/U: HCPCS | Performed by: SPECIALIST

## 2025-01-14 PROCEDURE — G8427 DOCREV CUR MEDS BY ELIG CLIN: HCPCS | Performed by: SPECIALIST

## 2025-01-14 PROCEDURE — 3023F SPIROM DOC REV: CPT | Performed by: SPECIALIST

## 2025-01-14 PROCEDURE — 3017F COLORECTAL CA SCREEN DOC REV: CPT | Performed by: SPECIALIST

## 2025-01-14 NOTE — PROGRESS NOTES
Orders for Check an echo and exercise cardiolite (CP, SOB)   Check carotid dopplers (question of TIA)     CT heart scan     See me in 6 months per Dr. Ball's VO.

## 2025-01-14 NOTE — PROGRESS NOTES
Chief Complaint   Patient presents with    Chest Pain     Vitals:    01/14/25 1059   BP: 132/84   Site: Left Upper Arm   Position: Sitting   Pulse: 80   SpO2: 97%   Weight: 82.6 kg (182 lb)   Height: 1.626 m (5' 4\")         Chest pain: DENIED     Recent hospital stays: DENIED     Refills: DENIED

## 2025-01-14 NOTE — PROGRESS NOTES
Skyler Ball MD. [facility]          Patient: Karyna Basilio  : 1960      Today's Date: 2025        HISTORY OF PRESENT ILLNESS:     History of Present Illness:  Referred for chest pain     Dr. Gonzalez noted \"Chest pain: She has been having chest pain on and off that sometimes radiates up to her jaw. She was seen in the ER for this and EKG did not show any acute changes and troponin was negative and CXR negative for acute process. She was advised to follow-up with Cardiology and would like a referral. \"    Has COPD and bronchitis -- sometimes pain with that.  Has class 2 SANABRIA.   No known heart disease but EKG's abnormal.         PAST MEDICAL HISTORY:     Past Medical History:   Diagnosis Date    Anxiety and depression     COPD (chronic obstructive pulmonary disease) (HCC)     COPD exacerbation (HCC) 10/2022    High cholesterol     Hypertension        Past Surgical History:   Procedure Laterality Date    APPENDECTOMY       SECTION      x 2    COLONOSCOPY  2012    repeat in 5 years    HYSTERECTOMY (CERVIX STATUS UNKNOWN)      HYSTERECTOMY, TOTAL ABDOMINAL (CERVIX REMOVED)  2005    JOINT REPLACEMENT  2004    ORTHOPEDIC SURGERY Left     release of ulnar nerve     OVARY REMOVAL      TOTAL HIP ARTHROPLASTY Left     TOTAL HIP ARTHROPLASTY Right              CURRENT MEDICATIONS:    .  Current Outpatient Medications   Medication Sig Dispense Refill    escitalopram (LEXAPRO) 20 MG tablet TAKE 1 TABLET BY MOUTH EVERY DAY 90 tablet 1    SPIRIVA RESPIMAT 2.5 MCG/ACT AERS inhaler INHALE 2 PUFFS ONCE DAILY      tiZANidine (ZANAFLEX) 4 MG tablet Take 1 tablet by mouth 3 times daily as needed (pain) 30 tablet 0    omeprazole (PRILOSEC) 20 MG delayed release capsule Take 1 capsule by mouth in the morning and at bedtime 180 capsule 1    albuterol sulfate HFA (VENTOLIN HFA) 108 (90 Base) MCG/ACT inhaler Inhale 2 puffs into the lungs 4 times daily as needed for Wheezing 18 g 5    acetaminophen (TYLENOL) 500 MG

## 2025-01-14 NOTE — PATIENT INSTRUCTIONS
chips.  Where can you learn more?  Go to [email or web address] and enter O407 to learn more about \"Learning About the Mediterranean Diet.\"  Current as of: September 20, 2023  Content Version: 14.3  © 2024 [employer].   Care instructions adapted under license by [employer]. If you have questions about a medical condition or this instruction, always ask your healthcare professional. [employer] disclaims any warranty or liability for your use of this information.

## 2025-02-25 ENCOUNTER — ANCILLARY PROCEDURE (OUTPATIENT)
Age: 65
End: 2025-02-25
Payer: MEDICARE

## 2025-02-25 VITALS
WEIGHT: 182 LBS | SYSTOLIC BLOOD PRESSURE: 162 MMHG | HEART RATE: 80 BPM | BODY MASS INDEX: 31.07 KG/M2 | HEIGHT: 64 IN | DIASTOLIC BLOOD PRESSURE: 86 MMHG

## 2025-02-25 VITALS
SYSTOLIC BLOOD PRESSURE: 162 MMHG | HEART RATE: 81 BPM | BODY MASS INDEX: 31.07 KG/M2 | DIASTOLIC BLOOD PRESSURE: 86 MMHG | WEIGHT: 182 LBS | HEIGHT: 64 IN

## 2025-02-25 DIAGNOSIS — R07.9 CHEST PAIN, UNSPECIFIED TYPE: ICD-10-CM

## 2025-02-25 DIAGNOSIS — E78.2 MIXED HYPERLIPIDEMIA: ICD-10-CM

## 2025-02-25 DIAGNOSIS — G24.5 EYE TWITCH: ICD-10-CM

## 2025-02-25 DIAGNOSIS — R06.02 SHORTNESS OF BREATH: ICD-10-CM

## 2025-02-25 DIAGNOSIS — F17.200 TOBACCO DEPENDENCE: ICD-10-CM

## 2025-02-25 DIAGNOSIS — R94.31 ABNORMAL EKG: ICD-10-CM

## 2025-02-25 DIAGNOSIS — J44.9 CHRONIC OBSTRUCTIVE PULMONARY DISEASE, UNSPECIFIED COPD TYPE (HCC): ICD-10-CM

## 2025-02-25 LAB
ECHO AO ROOT DIAM: 3 CM
ECHO AO ROOT INDEX: 1.6 CM/M2
ECHO AV MEAN GRADIENT: 3 MMHG
ECHO AV MEAN VELOCITY: 0.9 M/S
ECHO AV PEAK GRADIENT: 6 MMHG
ECHO AV PEAK VELOCITY: 1.2 M/S
ECHO AV VELOCITY RATIO: 1
ECHO AV VTI: 26.8 CM
ECHO BSA: 1.93 M2
ECHO BSA: 1.93 M2
ECHO LA DIAMETER INDEX: 1.49 CM/M2
ECHO LA DIAMETER: 2.8 CM
ECHO LA TO AORTIC ROOT RATIO: 0.93
ECHO LV E' LATERAL VELOCITY: 9.85 CM/S
ECHO LV E' SEPTAL VELOCITY: 7.32 CM/S
ECHO LV EDV A2C: 74 ML
ECHO LV EDV A4C: 91 ML
ECHO LV EDV BP: 83 ML (ref 56–104)
ECHO LV EDV INDEX A4C: 48 ML/M2
ECHO LV EDV INDEX BP: 44 ML/M2
ECHO LV EDV NDEX A2C: 39 ML/M2
ECHO LV EF PHYSICIAN: 60 %
ECHO LV EJECTION FRACTION A2C: 52 %
ECHO LV EJECTION FRACTION A4C: 60 %
ECHO LV ESV A2C: 35 ML
ECHO LV ESV A4C: 37 ML
ECHO LV ESV BP: 36 ML (ref 19–49)
ECHO LV ESV INDEX A2C: 19 ML/M2
ECHO LV ESV INDEX A4C: 20 ML/M2
ECHO LV ESV INDEX BP: 19 ML/M2
ECHO LV FRACTIONAL SHORTENING: 34 % (ref 28–44)
ECHO LV INTERNAL DIMENSION DIASTOLE INDEX: 2.5 CM/M2
ECHO LV INTERNAL DIMENSION DIASTOLIC: 4.7 CM (ref 3.9–5.3)
ECHO LV INTERNAL DIMENSION SYSTOLIC INDEX: 1.65 CM/M2
ECHO LV INTERNAL DIMENSION SYSTOLIC: 3.1 CM
ECHO LV IVSD: 0.9 CM (ref 0.6–0.9)
ECHO LV MASS 2D: 142.7 G (ref 67–162)
ECHO LV MASS INDEX 2D: 75.9 G/M2 (ref 43–95)
ECHO LV POSTERIOR WALL DIASTOLIC: 0.9 CM (ref 0.6–0.9)
ECHO LV RELATIVE WALL THICKNESS RATIO: 0.38
ECHO LVOT AV VTI INDEX: 0.9
ECHO LVOT MEAN GRADIENT: 2 MMHG
ECHO LVOT PEAK GRADIENT: 5 MMHG
ECHO LVOT PEAK VELOCITY: 1.2 M/S
ECHO LVOT VTI: 24 CM
ECHO MV A VELOCITY: 1.45 M/S
ECHO MV AREA PHT: 4.6 CM2
ECHO MV E DECELERATION TIME (DT): 165.4 MS
ECHO MV E VELOCITY: 1.23 M/S
ECHO MV E/A RATIO: 0.85
ECHO MV E/E' LATERAL: 12.49
ECHO MV E/E' RATIO (AVERAGED): 14.65
ECHO MV E/E' SEPTAL: 16.8
ECHO MV PRESSURE HALF TIME (PHT): 48 MS
ECHO RV FREE WALL PEAK S': 14.8 CM/S
ECHO RV INTERNAL DIMENSION: 3.9 CM
ECHO RV TAPSE: 2.9 CM (ref 1.7–?)
NUC STRESS EJECTION FRACTION: 81 %
STRESS BASELINE DIAS BP: 86 MMHG
STRESS BASELINE HR: 81 BPM
STRESS BASELINE SYS BP: 152 MMHG
STRESS ESTIMATED WORKLOAD: 0 METS
STRESS EXERCISE DUR MIN: 0 MIN
STRESS EXERCISE DUR SEC: 0 SEC
STRESS O2 SAT PEAK: 99 %
STRESS O2 SAT REST: 98 %
STRESS PEAK DIAS BP: 86 MMHG
STRESS PEAK SYS BP: 162 MMHG
STRESS PERCENT HR ACHIEVED: 80 %
STRESS POST PEAK HR: 126 BPM
STRESS RATE PRESSURE PRODUCT: NORMAL BPM*MMHG
STRESS TARGET HR: 158 BPM
TID: 0.96
VAS LEFT CCA DIST EDV: 14.1 CM/S
VAS LEFT CCA DIST PSV: 48.9 CM/S
VAS LEFT CCA PROX EDV: 14.8 CM/S
VAS LEFT CCA PROX PSV: 62.3 CM/S
VAS LEFT ECA EDV: 11.49 CM/S
VAS LEFT ECA PSV: 68.4 CM/S
VAS LEFT ICA DIST EDV: 34.7 CM/S
VAS LEFT ICA DIST PSV: 88.7 CM/S
VAS LEFT ICA MID EDV: 16.6 CM/S
VAS LEFT ICA MID PSV: 55 CM/S
VAS LEFT ICA PROX EDV: 15.2 CM/S
VAS LEFT ICA PROX PSV: 54.7 CM/S
VAS LEFT ICA/CCA PSV: 1.42 NO UNITS
VAS LEFT VERTEBRAL EDV: 12.65 CM/S
VAS LEFT VERTEBRAL PSV: 48.5 CM/S
VAS RIGHT CCA DIST EDV: 8.3 CM/S
VAS RIGHT CCA DIST PSV: 61.2 CM/S
VAS RIGHT CCA PROX EDV: 12.7 CM/S
VAS RIGHT CCA PROX PSV: 59.4 CM/S
VAS RIGHT ECA EDV: 10.41 CM/S
VAS RIGHT ECA PSV: 67 CM/S
VAS RIGHT ICA DIST EDV: 30.6 CM/S
VAS RIGHT ICA DIST PSV: 82.9 CM/S
VAS RIGHT ICA MID EDV: 16.9 CM/S
VAS RIGHT ICA MID PSV: 70.6 CM/S
VAS RIGHT ICA PROX EDV: 14.8 CM/S
VAS RIGHT ICA PROX PSV: 56.8 CM/S
VAS RIGHT ICA/CCA PSV: 1.4 NO UNITS
VAS RIGHT VERTEBRAL EDV: 16.27 CM/S
VAS RIGHT VERTEBRAL PSV: 48.5 CM/S

## 2025-02-25 PROCEDURE — 93880 EXTRACRANIAL BILAT STUDY: CPT | Performed by: SPECIALIST

## 2025-02-25 PROCEDURE — PBSHW PBB SHADOW CHARGE: Performed by: SPECIALIST

## 2025-02-25 PROCEDURE — A9500 TC99M SESTAMIBI: HCPCS | Performed by: SPECIALIST

## 2025-02-25 PROCEDURE — 93016 CV STRESS TEST SUPVJ ONLY: CPT | Performed by: SPECIALIST

## 2025-02-25 PROCEDURE — 93306 TTE W/DOPPLER COMPLETE: CPT | Performed by: SPECIALIST

## 2025-02-25 PROCEDURE — 93018 CV STRESS TEST I&R ONLY: CPT | Performed by: SPECIALIST

## 2025-02-25 PROCEDURE — 78452 HT MUSCLE IMAGE SPECT MULT: CPT | Performed by: SPECIALIST

## 2025-02-25 RX ORDER — TETRAKIS(2-METHOXYISOBUTYLISOCYANIDE)COPPER(I) TETRAFLUOROBORATE 1 MG/ML
8.4 INJECTION, POWDER, LYOPHILIZED, FOR SOLUTION INTRAVENOUS
Status: COMPLETED | OUTPATIENT
Start: 2025-02-25 | End: 2025-02-25

## 2025-02-25 RX ORDER — REGADENOSON 0.08 MG/ML
0.4 INJECTION, SOLUTION INTRAVENOUS
Status: COMPLETED | OUTPATIENT
Start: 2025-02-25 | End: 2025-02-25

## 2025-02-25 RX ORDER — TETRAKIS(2-METHOXYISOBUTYLISOCYANIDE)COPPER(I) TETRAFLUOROBORATE 1 MG/ML
25.7 INJECTION, POWDER, LYOPHILIZED, FOR SOLUTION INTRAVENOUS
Status: COMPLETED | OUTPATIENT
Start: 2025-02-25 | End: 2025-02-25

## 2025-02-25 RX ADMIN — TECHNETIUM TC-99M SESTAMIBI 25.7 MILLICURIE: 1 INJECTION INTRAVENOUS at 11:10

## 2025-02-25 RX ADMIN — REGADENOSON 0.4 MG: 0.08 INJECTION, SOLUTION INTRAVENOUS at 11:14

## 2025-02-25 RX ADMIN — TECHNETIUM TC-99M SESTAMIBI 8.4 MILLICURIE: 1 INJECTION INTRAVENOUS at 12:57

## 2025-04-01 SDOH — ECONOMIC STABILITY: INCOME INSECURITY: IN THE LAST 12 MONTHS, WAS THERE A TIME WHEN YOU WERE NOT ABLE TO PAY THE MORTGAGE OR RENT ON TIME?: YES

## 2025-04-01 SDOH — ECONOMIC STABILITY: FOOD INSECURITY: WITHIN THE PAST 12 MONTHS, THE FOOD YOU BOUGHT JUST DIDN'T LAST AND YOU DIDN'T HAVE MONEY TO GET MORE.: NEVER TRUE

## 2025-04-01 SDOH — ECONOMIC STABILITY: FOOD INSECURITY: WITHIN THE PAST 12 MONTHS, YOU WORRIED THAT YOUR FOOD WOULD RUN OUT BEFORE YOU GOT MONEY TO BUY MORE.: NEVER TRUE

## 2025-04-01 SDOH — ECONOMIC STABILITY: TRANSPORTATION INSECURITY
IN THE PAST 12 MONTHS, HAS LACK OF TRANSPORTATION KEPT YOU FROM MEETINGS, WORK, OR FROM GETTING THINGS NEEDED FOR DAILY LIVING?: NO

## 2025-04-01 SDOH — ECONOMIC STABILITY: TRANSPORTATION INSECURITY
IN THE PAST 12 MONTHS, HAS THE LACK OF TRANSPORTATION KEPT YOU FROM MEDICAL APPOINTMENTS OR FROM GETTING MEDICATIONS?: NO

## 2025-04-02 ENCOUNTER — OFFICE VISIT (OUTPATIENT)
Facility: CLINIC | Age: 65
End: 2025-04-02
Payer: MEDICARE

## 2025-04-02 VITALS
HEIGHT: 64 IN | TEMPERATURE: 97.5 F | RESPIRATION RATE: 16 BRPM | OXYGEN SATURATION: 97 % | BODY MASS INDEX: 32.44 KG/M2 | WEIGHT: 190 LBS | HEART RATE: 66 BPM | SYSTOLIC BLOOD PRESSURE: 148 MMHG | DIASTOLIC BLOOD PRESSURE: 88 MMHG

## 2025-04-02 DIAGNOSIS — Z09 HOSPITAL DISCHARGE FOLLOW-UP: ICD-10-CM

## 2025-04-02 DIAGNOSIS — J44.1 COPD EXACERBATION (HCC): ICD-10-CM

## 2025-04-02 DIAGNOSIS — Z28.20 VACCINE REFUSED BY PATIENT: ICD-10-CM

## 2025-04-02 DIAGNOSIS — J44.9 CHRONIC OBSTRUCTIVE PULMONARY DISEASE, UNSPECIFIED COPD TYPE (HCC): ICD-10-CM

## 2025-04-02 DIAGNOSIS — Z12.31 BREAST CANCER SCREENING BY MAMMOGRAM: ICD-10-CM

## 2025-04-02 DIAGNOSIS — R03.0 ELEVATED BLOOD PRESSURE READING WITHOUT DIAGNOSIS OF HYPERTENSION: ICD-10-CM

## 2025-04-02 DIAGNOSIS — J96.20 ACUTE ON CHRONIC RESPIRATORY FAILURE, UNSPECIFIED WHETHER WITH HYPOXIA OR HYPERCAPNIA: Primary | ICD-10-CM

## 2025-04-02 DIAGNOSIS — D72.829 LEUKOCYTOSIS, UNSPECIFIED TYPE: ICD-10-CM

## 2025-04-02 DIAGNOSIS — Z12.11 COLON CANCER SCREENING: ICD-10-CM

## 2025-04-02 DIAGNOSIS — Z87.891 PERSONAL HISTORY OF TOBACCO USE: ICD-10-CM

## 2025-04-02 PROBLEM — Z86.59 HISTORY OF BIPOLAR DISORDER: Status: RESOLVED | Noted: 2022-04-25 | Resolved: 2025-04-02

## 2025-04-02 PROBLEM — Z87.01 HISTORY OF PNEUMONIA: Status: RESOLVED | Noted: 2024-02-15 | Resolved: 2025-04-02

## 2025-04-02 PROBLEM — R91.8 BILATERAL PULMONARY INFILTRATES ON CHEST X-RAY: Status: RESOLVED | Noted: 2022-09-22 | Resolved: 2025-04-02

## 2025-04-02 PROBLEM — Z87.09 HISTORY OF INFLUENZA: Status: RESOLVED | Noted: 2024-02-15 | Resolved: 2025-04-02

## 2025-04-02 PROCEDURE — G0296 VISIT TO DETERM LDCT ELIG: HCPCS | Performed by: NURSE PRACTITIONER

## 2025-04-02 PROCEDURE — 99214 OFFICE O/P EST MOD 30 MIN: CPT | Performed by: NURSE PRACTITIONER

## 2025-04-02 RX ORDER — NICOTINE 21 MG/24HR
1 PATCH, TRANSDERMAL 24 HOURS TRANSDERMAL EVERY 24 HOURS
Qty: 42 PATCH | Refills: 0 | Status: SHIPPED | OUTPATIENT
Start: 2025-04-02 | End: 2026-04-02

## 2025-04-02 RX ORDER — PREDNISONE 10 MG/1
10 TABLET ORAL
COMMUNITY
Start: 2025-03-28

## 2025-04-02 RX ORDER — FLUTICASONE FUROATE, UMECLIDINIUM BROMIDE AND VILANTEROL TRIFENATATE 200; 62.5; 25 UG/1; UG/1; UG/1
1 POWDER RESPIRATORY (INHALATION) DAILY
Qty: 3 EACH | Refills: 1 | Status: SHIPPED | OUTPATIENT
Start: 2025-04-02

## 2025-04-02 ASSESSMENT — PATIENT HEALTH QUESTIONNAIRE - PHQ9
SUM OF ALL RESPONSES TO PHQ QUESTIONS 1-9: 0
2. FEELING DOWN, DEPRESSED OR HOPELESS: NOT AT ALL
1. LITTLE INTEREST OR PLEASURE IN DOING THINGS: NOT AT ALL
SUM OF ALL RESPONSES TO PHQ QUESTIONS 1-9: 0

## 2025-04-02 NOTE — PROGRESS NOTES
Chief Complaint   Patient presents with    Follow-Up from Hospital     [facility]     \"Have you been to the ER, urgent care clinic since your last visit?  Hospitalized since your last visit?\"    YES - When: approximately 1  weeks ago.  Where and Why: [facility]    “Have you seen or consulted any other health care providers outside of [facility] since your last visit?”    YES - When: approximately 1 days ago.  Where and Why: pulmonology.        “Have you had a colorectal cancer screening such as a colonoscopy/FIT/Cologuard?    NO    No colonoscopy on file  No cologuard on file  No FIT/FOBT on file   No flexible sigmoidoscopy on file         Click Here for Release of Records Request   PHQ-9 Total Score: 0 (4/2/2025  1:54 PM)

## 2025-04-02 NOTE — ASSESSMENT & PLAN NOTE
Stop Symbicort and start Trelegy as ordered.  Call if inhaler is not covered by insurance.  Orders:    fluticasone-umeclidin-vilant (TRELEGY ELLIPTA) 200-62.5-25 MCG/ACT AEPB inhaler; Inhale 1 puff into the lungs daily

## 2025-04-02 NOTE — PROGRESS NOTES
Subjective  Chief Complaint   Patient presents with   • Follow-Up from Hospital     [facility], copd     HPI:  Karyna Basilio is a 64 y.o. female.  This patient is here for hospital follow up. Discharge summary reviewed today by me.    Admission dates: 3/26 to 3/28/2025 at [facility]    Admission diagnosis: Shortness of breath, cough    Discharge diagnosis: COPD exacerbation    Treatment: per discharge summary    Specialist follow up appointments:   Primary care today  Pulmonary yesterday- continue current inhalers and home oxygen prn, she did not discuss cost of Spiriva with pulmonary    Update: Since discharge patient denies any fevers, lower extremity edema, diarrhea, and constipation. Reports shortness of breath with exertion only. This is her baseline. Medication lists reconciled, home meds have been resumed with no questions. Spiriva was prescribed but not covered by insurance. Azithromycin has been completed. She continues to take Prednisone taper. She uses O2 2L nightly when sleeping. Home O2 sats 92-96%. She does not monitor home BP.     Past Medical History:   Diagnosis Date   • Anxiety and depression    • COPD (chronic obstructive pulmonary disease) ([facility])    • COPD exacerbation ([facility]) 10/2022   • COPD exacerbation ([facility]) 03/2025   • High cholesterol    • Hypertension    • Pneumonia      Family History   Adopted: Yes   Problem Relation Age of Onset   • COPD Mother    • Prostate Cancer Father    • Diabetes Father    • Diabetes Brother    • Lung Cancer Brother    • Unknown Maternal Grandmother    • Unknown Maternal Grandfather    • Unknown Paternal Grandmother    • Unknown Paternal Grandfather      Social History     Socioeconomic History   • Marital status:      Spouse name: Not on file   • Number of children: Not on file   • Years of education: Not on file   • Highest education level: Not on file   Occupational History   • Not on file   Tobacco Use   • Smoking status: Every Day     Current

## 2025-04-02 NOTE — ASSESSMENT & PLAN NOTE
She was without her ICS inhaler which was identified as cause of exacerbation.  Continue steroids until completed.  Follow-up with pulmonary as scheduled.

## 2025-04-18 SDOH — HEALTH STABILITY: PHYSICAL HEALTH: ON AVERAGE, HOW MANY MINUTES DO YOU ENGAGE IN EXERCISE AT THIS LEVEL?: 0 MIN

## 2025-04-18 SDOH — HEALTH STABILITY: PHYSICAL HEALTH: ON AVERAGE, HOW MANY DAYS PER WEEK DO YOU ENGAGE IN MODERATE TO STRENUOUS EXERCISE (LIKE A BRISK WALK)?: 0 DAYS

## 2025-04-18 ASSESSMENT — LIFESTYLE VARIABLES
HOW OFTEN DO YOU HAVE A DRINK CONTAINING ALCOHOL: 2-3 TIMES A WEEK
HOW OFTEN DURING THE LAST YEAR HAVE YOU HAD A FEELING OF GUILT OR REMORSE AFTER DRINKING: NEVER
HOW OFTEN DURING THE LAST YEAR HAVE YOU FOUND THAT YOU WERE NOT ABLE TO STOP DRINKING ONCE YOU HAD STARTED: NEVER
HOW OFTEN DURING THE LAST YEAR HAVE YOU FAILED TO DO WHAT WAS NORMALLY EXPECTED FROM YOU BECAUSE OF DRINKING: NEVER
HOW MANY STANDARD DRINKS CONTAINING ALCOHOL DO YOU HAVE ON A TYPICAL DAY: 3 OR 4
HOW OFTEN DURING THE LAST YEAR HAVE YOU HAD A FEELING OF GUILT OR REMORSE AFTER DRINKING: NEVER
HOW OFTEN DO YOU HAVE SIX OR MORE DRINKS ON ONE OCCASION: 98
HOW OFTEN DURING THE LAST YEAR HAVE YOU BEEN UNABLE TO REMEMBER WHAT HAPPENED THE NIGHT BEFORE BECAUSE YOU HAD BEEN DRINKING: NEVER
HAVE YOU OR SOMEONE ELSE BEEN INJURED AS A RESULT OF YOUR DRINKING: NO
HAS A RELATIVE, FRIEND, DOCTOR, OR ANOTHER HEALTH PROFESSIONAL EXPRESSED CONCERN ABOUT YOUR DRINKING OR SUGGESTED YOU CUT DOWN: YES, BUT NOT IN THE PAST YEAR
HOW OFTEN DURING THE LAST YEAR HAVE YOU NEEDED AN ALCOHOLIC DRINK FIRST THING IN THE MORNING TO GET YOURSELF GOING AFTER A NIGHT OF HEAVY DRINKING: NEVER
HOW OFTEN DURING THE LAST YEAR HAVE YOU FOUND THAT YOU WERE NOT ABLE TO STOP DRINKING ONCE YOU HAD STARTED: NEVER
HAS A RELATIVE, FRIEND, DOCTOR, OR ANOTHER HEALTH PROFESSIONAL EXPRESSED CONCERN ABOUT YOUR DRINKING OR SUGGESTED YOU CUT DOWN: YES, BUT NOT IN THE PAST YEAR
HOW OFTEN DO YOU HAVE A DRINK CONTAINING ALCOHOL: 4
HOW OFTEN DURING THE LAST YEAR HAVE YOU NEEDED AN ALCOHOLIC DRINK FIRST THING IN THE MORNING TO GET YOURSELF GOING AFTER A NIGHT OF HEAVY DRINKING: NEVER
HOW MANY STANDARD DRINKS CONTAINING ALCOHOL DO YOU HAVE ON A TYPICAL DAY: 2
HAVE YOU OR SOMEONE ELSE BEEN INJURED AS A RESULT OF YOUR DRINKING: NO
HOW OFTEN DURING THE LAST YEAR HAVE YOU FAILED TO DO WHAT WAS NORMALLY EXPECTED FROM YOU BECAUSE OF DRINKING: NEVER
HOW OFTEN DURING THE LAST YEAR HAVE YOU BEEN UNABLE TO REMEMBER WHAT HAPPENED THE NIGHT BEFORE BECAUSE YOU HAD BEEN DRINKING: NEVER

## 2025-04-18 ASSESSMENT — PATIENT HEALTH QUESTIONNAIRE - PHQ9
SUM OF ALL RESPONSES TO PHQ QUESTIONS 1-9: 0
2. FEELING DOWN, DEPRESSED OR HOPELESS: NOT AT ALL
SUM OF ALL RESPONSES TO PHQ QUESTIONS 1-9: 0

## 2025-04-22 ENCOUNTER — OFFICE VISIT (OUTPATIENT)
Facility: CLINIC | Age: 65
End: 2025-04-22
Payer: MEDICARE

## 2025-04-22 VITALS
DIASTOLIC BLOOD PRESSURE: 86 MMHG | HEIGHT: 64 IN | TEMPERATURE: 97.5 F | WEIGHT: 192 LBS | RESPIRATION RATE: 16 BRPM | SYSTOLIC BLOOD PRESSURE: 132 MMHG | HEART RATE: 67 BPM | OXYGEN SATURATION: 97 % | BODY MASS INDEX: 32.78 KG/M2

## 2025-04-22 DIAGNOSIS — Z28.20 VACCINE REFUSED BY PATIENT: ICD-10-CM

## 2025-04-22 DIAGNOSIS — M62.838 MUSCLE SPASM: ICD-10-CM

## 2025-04-22 DIAGNOSIS — Z87.891 PERSONAL HISTORY OF TOBACCO USE: ICD-10-CM

## 2025-04-22 DIAGNOSIS — H91.93 BILATERAL HEARING LOSS, UNSPECIFIED HEARING LOSS TYPE: ICD-10-CM

## 2025-04-22 DIAGNOSIS — E66.09 CLASS 1 OBESITY DUE TO EXCESS CALORIES WITH SERIOUS COMORBIDITY AND BODY MASS INDEX (BMI) OF 32.0 TO 32.9 IN ADULT: ICD-10-CM

## 2025-04-22 DIAGNOSIS — F17.210 TOBACCO DEPENDENCE DUE TO CIGARETTES: ICD-10-CM

## 2025-04-22 DIAGNOSIS — J44.9 CHRONIC OBSTRUCTIVE PULMONARY DISEASE, UNSPECIFIED COPD TYPE (HCC): ICD-10-CM

## 2025-04-22 DIAGNOSIS — F41.8 MIXED ANXIETY AND DEPRESSIVE DISORDER: ICD-10-CM

## 2025-04-22 DIAGNOSIS — R03.0 ELEVATED BLOOD PRESSURE READING WITHOUT DIAGNOSIS OF HYPERTENSION: ICD-10-CM

## 2025-04-22 DIAGNOSIS — E78.2 MIXED HYPERLIPIDEMIA: ICD-10-CM

## 2025-04-22 DIAGNOSIS — E66.811 CLASS 1 OBESITY DUE TO EXCESS CALORIES WITH SERIOUS COMORBIDITY AND BODY MASS INDEX (BMI) OF 32.0 TO 32.9 IN ADULT: ICD-10-CM

## 2025-04-22 DIAGNOSIS — Z00.00 MEDICARE ANNUAL WELLNESS VISIT, SUBSEQUENT: Primary | ICD-10-CM

## 2025-04-22 DIAGNOSIS — K21.9 GASTROESOPHAGEAL REFLUX DISEASE, UNSPECIFIED WHETHER ESOPHAGITIS PRESENT: ICD-10-CM

## 2025-04-22 DIAGNOSIS — M15.0 PRIMARY OSTEOARTHRITIS INVOLVING MULTIPLE JOINTS: ICD-10-CM

## 2025-04-22 PROCEDURE — G0439 PPPS, SUBSEQ VISIT: HCPCS | Performed by: NURSE PRACTITIONER

## 2025-04-22 PROCEDURE — G0296 VISIT TO DETERM LDCT ELIG: HCPCS | Performed by: NURSE PRACTITIONER

## 2025-04-22 PROCEDURE — 99214 OFFICE O/P EST MOD 30 MIN: CPT | Performed by: NURSE PRACTITIONER

## 2025-04-22 NOTE — PROGRESS NOTES
Medicare Annual Wellness Visit    Karyna Basilio is here for Medicare AWV    Assessment & Plan  1. Medicare wellness visit.  Her weight is considered elevated. She has not seen a dentist in the past year and is due for an eye exam. She does not consistently wear her seatbelt while driving. She does not have an advanced directive or living will and has declined information on these topics at this time. She is due for COVID-19, shingles, and RSV vaccines, which she has declined. Her last colonoscopy was on 08/29/2022, and she is due for another in 5 years. Her labs were checked by Dr. Gonzalez in October 2024 and were all within normal limits. She is advised to schedule appointments with her dentist and optometrist. She is encouraged to wear her seatbelt at all times while driving. She is advised to schedule a CT scan of her chest and a mammogram.    2. Chronic pain.  She reports new or increased pain, including lower back pain. She is advised to take diclofenac as needed and not daily due to potential interactions with Lexapro. Over-the-counter Tylenol is recommended as a safer option, not exceeding 3000 mg per day.    3. Fatigue.  She reports feeling tired all the time, likely due to boredom and lack of exercise. She is advised to engage in regular physical activity, aiming for at least 10,000 steps per day or 150 minutes of exercise per week.    4. Hearing loss.  She reports trouble hearing. A referral to an ENT specialist will be made for further evaluation.    5. Anxiety and depression.  She is currently taking Lexapro daily, which is working well. She is advised to continue taking Lexapro daily and to contact the office if she has any concerns before the next visit.    6. Gastroesophageal reflux disease (GERD).  She is advised to identify and avoid trigger foods and to quit smoking. Omeprazole should be taken as needed, not exceeding one capsule per day.    7. Muscle spasms.  She is taking Zanaflex 
resources for any of these topics? No, thank you           Click Here for Release of Records Request   No data recorded

## 2025-04-22 NOTE — PATIENT INSTRUCTIONS

## 2025-06-02 DIAGNOSIS — F41.8 MIXED ANXIETY AND DEPRESSIVE DISORDER: ICD-10-CM

## 2025-06-02 RX ORDER — DICLOFENAC SODIUM 75 MG/1
TABLET, DELAYED RELEASE ORAL
Qty: 60 TABLET | Refills: 1 | Status: SHIPPED | OUTPATIENT
Start: 2025-06-02

## 2025-06-02 RX ORDER — ESCITALOPRAM OXALATE 20 MG/1
20 TABLET ORAL DAILY
Qty: 90 TABLET | Refills: 1 | Status: SHIPPED | OUTPATIENT
Start: 2025-06-02

## 2025-06-25 ENCOUNTER — HOSPITAL ENCOUNTER (OUTPATIENT)
Facility: HOSPITAL | Age: 65
Discharge: HOME | End: 2025-06-28
Payer: MEDICARE

## 2025-06-25 ENCOUNTER — OFFICE VISIT (OUTPATIENT)
Facility: CLINIC | Age: 65
End: 2025-06-25

## 2025-06-25 VITALS
RESPIRATION RATE: 16 BRPM | DIASTOLIC BLOOD PRESSURE: 70 MMHG | SYSTOLIC BLOOD PRESSURE: 132 MMHG | BODY MASS INDEX: 31.76 KG/M2 | HEIGHT: 64 IN | HEART RATE: 82 BPM | OXYGEN SATURATION: 97 % | TEMPERATURE: 97.8 F | WEIGHT: 186 LBS

## 2025-06-25 DIAGNOSIS — R06.2 WHEEZING: ICD-10-CM

## 2025-06-25 DIAGNOSIS — J44.9 CHRONIC OBSTRUCTIVE PULMONARY DISEASE, UNSPECIFIED COPD TYPE (HCC): Primary | ICD-10-CM

## 2025-06-25 DIAGNOSIS — T88.7XXA MEDICATION SIDE EFFECT: ICD-10-CM

## 2025-06-25 DIAGNOSIS — R03.0 ELEVATED BLOOD PRESSURE READING WITHOUT DIAGNOSIS OF HYPERTENSION: ICD-10-CM

## 2025-06-25 PROCEDURE — 71046 X-RAY EXAM CHEST 2 VIEWS: CPT

## 2025-06-25 RX ORDER — PREDNISONE 20 MG/1
40 TABLET ORAL DAILY
Qty: 10 TABLET | Refills: 0 | Status: SHIPPED | OUTPATIENT
Start: 2025-06-25 | End: 2025-06-30

## 2025-06-25 RX ORDER — UMECLIDINIUM 62.5 UG/1
1 AEROSOL, POWDER ORAL DAILY
Qty: 90 EACH | Refills: 0 | Status: SHIPPED | OUTPATIENT
Start: 2025-06-25

## 2025-06-25 ASSESSMENT — PATIENT HEALTH QUESTIONNAIRE - PHQ9
SUM OF ALL RESPONSES TO PHQ QUESTIONS 1-9: 1
1. LITTLE INTEREST OR PLEASURE IN DOING THINGS: NOT AT ALL
2. FEELING DOWN, DEPRESSED OR HOPELESS: SEVERAL DAYS
SUM OF ALL RESPONSES TO PHQ QUESTIONS 1-9: 1

## 2025-06-25 NOTE — PROGRESS NOTES
Chief Complaint   Patient presents with    Cough     Cough, pain under right breast area since starting trelegy     \"Have you been to the ER, urgent care clinic since your last visit?  Hospitalized since your last visit?\"    NO    “Have you seen or consulted any other health care providers outside of [facility] since your last visit?”    NO        “Have you had a colorectal cancer screening such as a colonoscopy/FIT/Cologuard?    YES - Type: Colonoscopy - [facility] to request most recent records if not in the chart     No colonoscopy on file  No cologuard on file  No FIT/FOBT on file   No flexible sigmoidoscopy on file         Click Here for Release of Records Request   PHQ-9 Total Score: 1 (6/25/2025  1:27 PM)

## 2025-06-25 NOTE — PROGRESS NOTES
Chief Complaint   Patient presents with    Cough     Cough, pain under right breast area since starting trelegy         Subjective   History of Present Illness  The patient presents for evaluation of COPD.    She reports a persistent cough, predominantly on the right side, accompanied by mucus production. These symptoms began following the initiation of Trelegy therapy, which she finds ineffective as it adheres to the back of her tongue rather than reaching her lungs. She has not had a recent consultation with her pulmonologist. She also experiences wheezing when lying down but does not use albuterol for this symptom. She does not report any shortness of breath. She has been experiencing diarrhea since starting Trelegy. She has discontinued smoking since yesterday. She has been avoiding outdoor activities due to the heat and has been staying indoors with air conditioning. She occasionally uses albuterol when she feels winded. She has noticed that her coughing subsides after bathing and suspects that dust in her house may be a contributing factor.        Results      Prior to Admission medications    Medication Sig Start Date End Date Taking? Authorizing Provider   umeclidinium bromide (INCRUSE ELLIPTA) 62.5 MCG/ACT inhaler Inhale 1 puff into the lungs daily 6/25/25  Yes Dania Jovel APRN - NP   predniSONE (DELTASONE) 20 MG tablet Take 2 tablets by mouth daily for 5 days 6/25/25 6/30/25 Yes Dania Jovel APRN - NP   escitalopram (LEXAPRO) 20 MG tablet TAKE 1 TABLET BY MOUTH EVERY DAY 6/2/25  Yes Cleo John APRN - NP   diclofenac (VOLTAREN) 75 MG EC tablet TAKE 1 TABLET BY MOUTH 2 TIMES A DAY AS NEEDED (PAIN) TAKE WITH FOOD 6/2/25  Yes Cleo John APRN - NP   albuterol (PROVENTIL) (2.5 MG/3ML) 0.083% nebulizer solution Take 3 mLs by nebulization every 4 hours as needed for Shortness of Breath or Wheezing 10/4/24  Yes Idalmis Gonzalez MD   albuterol sulfate HFA (VENTOLIN HFA) 108

## 2025-06-25 NOTE — ASSESSMENT & PLAN NOTE
Orders:    umeclidinium bromide (INCRUSE ELLIPTA) 62.5 MCG/ACT inhaler; Inhale 1 puff into the lungs daily

## 2025-06-26 ENCOUNTER — RESULTS FOLLOW-UP (OUTPATIENT)
Facility: CLINIC | Age: 65
End: 2025-06-26

## 2025-07-22 ENCOUNTER — OFFICE VISIT (OUTPATIENT)
Age: 65
End: 2025-07-22
Payer: MEDICARE

## 2025-07-22 VITALS
OXYGEN SATURATION: 97 % | WEIGHT: 186 LBS | HEART RATE: 81 BPM | BODY MASS INDEX: 31.76 KG/M2 | HEIGHT: 64 IN | DIASTOLIC BLOOD PRESSURE: 92 MMHG | SYSTOLIC BLOOD PRESSURE: 150 MMHG

## 2025-07-22 DIAGNOSIS — I10 PRIMARY HYPERTENSION: ICD-10-CM

## 2025-07-22 DIAGNOSIS — E78.2 MIXED HYPERLIPIDEMIA: ICD-10-CM

## 2025-07-22 DIAGNOSIS — R06.02 SHORTNESS OF BREATH: Primary | ICD-10-CM

## 2025-07-22 DIAGNOSIS — R07.9 CHEST PAIN, UNSPECIFIED TYPE: ICD-10-CM

## 2025-07-22 DIAGNOSIS — R94.31 ABNORMAL EKG: ICD-10-CM

## 2025-07-22 PROCEDURE — 93010 ELECTROCARDIOGRAM REPORT: CPT | Performed by: SPECIALIST

## 2025-07-22 PROCEDURE — 3080F DIAST BP >= 90 MM HG: CPT | Performed by: SPECIALIST

## 2025-07-22 PROCEDURE — 93005 ELECTROCARDIOGRAM TRACING: CPT | Performed by: SPECIALIST

## 2025-07-22 PROCEDURE — 3077F SYST BP >= 140 MM HG: CPT | Performed by: SPECIALIST

## 2025-07-22 PROCEDURE — 99214 OFFICE O/P EST MOD 30 MIN: CPT | Performed by: SPECIALIST

## 2025-07-22 RX ORDER — OLMESARTAN MEDOXOMIL 20 MG/1
20 TABLET ORAL DAILY
Qty: 90 TABLET | Refills: 3 | Status: SHIPPED | OUTPATIENT
Start: 2025-07-22

## 2025-07-22 NOTE — PROGRESS NOTES
Chief Complaint   Patient presents with    Shortness of Breath    Hyperlipidemia    Chest Pain    COPD     Vitals:    07/22/25 1043 07/22/25 1050   BP: (!) 150/90 (!) 150/92   BP Site: Left Upper Arm Left Upper Arm   Patient Position: Sitting Sitting   Pulse: 81    SpO2: 97%    Weight: 84.4 kg (186 lb)    Height: 1.626 m (5' 4\")          Chest pain: DENIED     Recent hospital stays: DENIED     Refills: DENIED

## 2025-07-22 NOTE — PROGRESS NOTES
Skyler Ball MD. [facility]          Patient: Karyna Basilio  : 1960      Today's Date: 2025        HISTORY OF PRESENT ILLNESS:     History of Present Illness:    Has COPD and bronchitis -- sometimes pain with that.  Has class chronic 2 SANABRIA.   Denies CP.        PAST MEDICAL HISTORY:     Past Medical History:   Diagnosis Date    Anxiety and depression     COPD (chronic obstructive pulmonary disease) (HCC)     COPD exacerbation (HCC) 10/2022    COPD exacerbation (HCC) 2025    High cholesterol     Hypertension     Pneumonia        Past Surgical History:   Procedure Laterality Date    APPENDECTOMY  1982     SECTION      x 2    COLONOSCOPY  2012    repeat in 5 years    COLONOSCOPY  2022    repeat in 5 years    HYSTERECTOMY, TOTAL ABDOMINAL (CERVIX REMOVED)      TOTAL HIP ARTHROPLASTY Left     TOTAL HIP ARTHROPLASTY Right     ULNAR NERVE REPAIR Left              CURRENT MEDICATIONS:    .  Current Outpatient Medications   Medication Sig Dispense Refill    umeclidinium bromide (INCRUSE ELLIPTA) 62.5 MCG/ACT inhaler Inhale 1 puff into the lungs daily 90 each 0    escitalopram (LEXAPRO) 20 MG tablet TAKE 1 TABLET BY MOUTH EVERY DAY 90 tablet 1    diclofenac (VOLTAREN) 75 MG EC tablet TAKE 1 TABLET BY MOUTH 2 TIMES A DAY AS NEEDED (PAIN) TAKE WITH FOOD 60 tablet 1    albuterol sulfate HFA (VENTOLIN HFA) 108 (90 Base) MCG/ACT inhaler Inhale 2 puffs into the lungs 4 times daily as needed for Wheezing 18 g 5    acetaminophen (TYLENOL) 500 MG tablet Take 2 tablets by mouth every 6 hours as needed for Pain 80 tablet 0    albuterol (PROVENTIL) (2.5 MG/3ML) 0.083% nebulizer solution Take 3 mLs by nebulization every 4 hours as needed for Shortness of Breath or Wheezing (Patient not taking: Reported on 2025) 120 each 1     No current facility-administered medications for this visit.       Allergies   Allergen Reactions    Nitrofurantoin Hives         SOCIAL HISTORY:     Social History

## 2025-07-23 ENCOUNTER — TELEPHONE (OUTPATIENT)
Facility: CLINIC | Age: 65
End: 2025-07-23

## 2025-07-23 NOTE — TELEPHONE ENCOUNTER
Left a vm for the patient to give us a call back to get her appt from today r/s. We received a message from the call center.

## 2025-08-05 RX ORDER — DICLOFENAC SODIUM 75 MG/1
TABLET, DELAYED RELEASE ORAL
Qty: 60 TABLET | Refills: 0 | Status: SHIPPED | OUTPATIENT
Start: 2025-08-05

## 2025-08-21 ENCOUNTER — OFFICE VISIT (OUTPATIENT)
Facility: CLINIC | Age: 65
End: 2025-08-21

## 2025-08-21 VITALS
OXYGEN SATURATION: 96 % | HEART RATE: 74 BPM | BODY MASS INDEX: 32.1 KG/M2 | RESPIRATION RATE: 16 BRPM | WEIGHT: 188 LBS | SYSTOLIC BLOOD PRESSURE: 132 MMHG | DIASTOLIC BLOOD PRESSURE: 80 MMHG | TEMPERATURE: 97.5 F | HEIGHT: 64 IN

## 2025-08-21 DIAGNOSIS — J44.1 COPD EXACERBATION (HCC): ICD-10-CM

## 2025-08-21 DIAGNOSIS — F17.210 TOBACCO DEPENDENCE DUE TO CIGARETTES: ICD-10-CM

## 2025-08-21 DIAGNOSIS — F10.90 ALCOHOL USE: ICD-10-CM

## 2025-08-21 DIAGNOSIS — Z12.31 BREAST CANCER SCREENING BY MAMMOGRAM: ICD-10-CM

## 2025-08-21 DIAGNOSIS — J96.21 ACUTE ON CHRONIC HYPOXIC RESPIRATORY FAILURE (HCC): ICD-10-CM

## 2025-08-21 DIAGNOSIS — I25.10 CORONARY ARTERY DISEASE INVOLVING NATIVE CORONARY ARTERY OF NATIVE HEART WITHOUT ANGINA PECTORIS: ICD-10-CM

## 2025-08-21 DIAGNOSIS — Z09 HOSPITAL DISCHARGE FOLLOW-UP: Primary | ICD-10-CM

## 2025-08-21 RX ORDER — IPRATROPIUM BROMIDE AND ALBUTEROL SULFATE 2.5; .5 MG/3ML; MG/3ML
1 SOLUTION RESPIRATORY (INHALATION) EVERY 4 HOURS
COMMUNITY

## 2025-08-21 RX ORDER — BUDESONIDE 0.5 MG/2ML
INHALANT ORAL
COMMUNITY
Start: 2025-08-10 | End: 2025-08-21

## 2025-08-21 RX ORDER — BUDESONIDE AND FORMOTEROL FUMARATE DIHYDRATE 160; 4.5 UG/1; UG/1
2 AEROSOL RESPIRATORY (INHALATION) 2 TIMES DAILY
COMMUNITY
Start: 2025-08-13

## 2025-08-21 RX ORDER — TIOTROPIUM BROMIDE INHALATION SPRAY 3.12 UG/1
SPRAY, METERED RESPIRATORY (INHALATION)
COMMUNITY
Start: 2025-08-13

## 2025-08-21 SDOH — ECONOMIC STABILITY: FOOD INSECURITY: WITHIN THE PAST 12 MONTHS, YOU WORRIED THAT YOUR FOOD WOULD RUN OUT BEFORE YOU GOT MONEY TO BUY MORE.: NEVER TRUE

## 2025-08-21 SDOH — ECONOMIC STABILITY: FOOD INSECURITY: WITHIN THE PAST 12 MONTHS, THE FOOD YOU BOUGHT JUST DIDN'T LAST AND YOU DIDN'T HAVE MONEY TO GET MORE.: NEVER TRUE

## 2025-08-21 ASSESSMENT — PATIENT HEALTH QUESTIONNAIRE - PHQ9
1. LITTLE INTEREST OR PLEASURE IN DOING THINGS: NOT AT ALL
SUM OF ALL RESPONSES TO PHQ QUESTIONS 1-9: 1
2. FEELING DOWN, DEPRESSED OR HOPELESS: SEVERAL DAYS
SUM OF ALL RESPONSES TO PHQ QUESTIONS 1-9: 1

## 2025-08-21 ASSESSMENT — LIFESTYLE VARIABLES
HOW MANY STANDARD DRINKS CONTAINING ALCOHOL DO YOU HAVE ON A TYPICAL DAY: 5 OR 6
HOW OFTEN DO YOU HAVE A DRINK CONTAINING ALCOHOL: 2-3 TIMES A WEEK
HOW OFTEN DO YOU HAVE A DRINK CONTAINING ALCOHOL: 2-3 TIMES A WEEK
HOW MANY STANDARD DRINKS CONTAINING ALCOHOL DO YOU HAVE ON A TYPICAL DAY: 5 OR 6

## 2025-08-28 ENCOUNTER — TELEMEDICINE ON DEMAND (OUTPATIENT)
Age: 65
End: 2025-08-28
Payer: MEDICARE

## 2025-08-28 ENCOUNTER — TELEPHONE (OUTPATIENT)
Age: 65
End: 2025-08-28

## 2025-08-28 DIAGNOSIS — R25.2 NOCTURNAL MUSCLE CRAMPS: Primary | ICD-10-CM

## 2025-08-28 PROCEDURE — 99213 OFFICE O/P EST LOW 20 MIN: CPT | Performed by: NURSE PRACTITIONER

## 2025-08-28 ASSESSMENT — ENCOUNTER SYMPTOMS
NAUSEA: 0
CHEST TIGHTNESS: 0
WHEEZING: 0
DIARRHEA: 0
SHORTNESS OF BREATH: 1
COUGH: 0
VOMITING: 0

## 2025-08-29 ENCOUNTER — TELEPHONE (OUTPATIENT)
Age: 65
End: 2025-08-29

## 2025-09-02 ENCOUNTER — OFFICE VISIT (OUTPATIENT)
Facility: CLINIC | Age: 65
End: 2025-09-02
Payer: MEDICARE

## 2025-09-02 VITALS
HEART RATE: 69 BPM | DIASTOLIC BLOOD PRESSURE: 80 MMHG | RESPIRATION RATE: 16 BRPM | WEIGHT: 186.13 LBS | HEIGHT: 64 IN | BODY MASS INDEX: 31.78 KG/M2 | SYSTOLIC BLOOD PRESSURE: 124 MMHG | OXYGEN SATURATION: 97 % | TEMPERATURE: 97.3 F

## 2025-09-02 DIAGNOSIS — T88.7XXA SIDE EFFECT OF MEDICATION: ICD-10-CM

## 2025-09-02 DIAGNOSIS — R25.2 MUSCLE CRAMP, NOCTURNAL: Primary | ICD-10-CM

## 2025-09-02 PROCEDURE — 3074F SYST BP LT 130 MM HG: CPT | Performed by: FAMILY MEDICINE

## 2025-09-02 PROCEDURE — 99214 OFFICE O/P EST MOD 30 MIN: CPT | Performed by: FAMILY MEDICINE

## 2025-09-02 PROCEDURE — 3079F DIAST BP 80-89 MM HG: CPT | Performed by: FAMILY MEDICINE

## 2025-09-02 ASSESSMENT — PATIENT HEALTH QUESTIONNAIRE - PHQ9
SUM OF ALL RESPONSES TO PHQ QUESTIONS 1-9: 0
1. LITTLE INTEREST OR PLEASURE IN DOING THINGS: NOT AT ALL
SUM OF ALL RESPONSES TO PHQ QUESTIONS 1-9: 0
2. FEELING DOWN, DEPRESSED OR HOPELESS: NOT AT ALL
SUM OF ALL RESPONSES TO PHQ QUESTIONS 1-9: 0
SUM OF ALL RESPONSES TO PHQ QUESTIONS 1-9: 0

## 2025-09-03 LAB
ALBUMIN: 4.2 G/DL (ref 3.9–4.9)
ALP BLD-CCNC: 70 IU/L (ref 44–121)
ALT SERPL-CCNC: 22 IU/L (ref 0–32)
AST SERPL-CCNC: 26 IU/L (ref 0–40)
BASOPHILS ABSOLUTE: 0 X10E3/UL (ref 0–0.2)
BASOPHILS RELATIVE PERCENT: 1 %
BILIRUB SERPL-MCNC: 0.3 MG/DL (ref 0–1.2)
BUN / CREAT RATIO: 15 (ref 12–28)
BUN BLDV-MCNC: 13 MG/DL (ref 8–27)
CALCIUM SERPL-MCNC: 9.4 MG/DL (ref 8.7–10.3)
CHLORIDE BLD-SCNC: 103 MMOL/L (ref 96–106)
CO2: 24 MMOL/L (ref 20–29)
CREAT SERPL-MCNC: 0.88 MG/DL (ref 0.57–1)
EOSINOPHILS ABSOLUTE: 0.2 X10E3/UL (ref 0–0.4)
EOSINOPHILS RELATIVE PERCENT: 4 %
ESTIMATED GLOMERULAR FILTRATION RATE CREATININE EQUATION: 74 ML/MIN/1.73
GLOBULIN: 2.1 G/DL (ref 1.5–4.5)
GLUCOSE BLD-MCNC: 87 MG/DL (ref 70–99)
HCT VFR BLD CALC: 38.9 % (ref 34–46.6)
HEMOGLOBIN: 12.8 G/DL (ref 11.1–15.9)
IMMATURE GRANS (ABS): 0 X10E3/UL (ref 0–0.1)
IMMATURE GRANULOCYTES %: 0 %
LYMPHOCYTES ABSOLUTE: 1.5 X10E3/UL (ref 0.7–3.1)
LYMPHOCYTES RELATIVE PERCENT: 35 %
MAGNESIUM: 2.2 MG/DL (ref 1.6–2.3)
MCH RBC QN AUTO: 32.7 PG (ref 26.6–33)
MCHC RBC AUTO-ENTMCNC: 32.9 G/DL (ref 31.5–35.7)
MCV RBC AUTO: 99 FL (ref 79–97)
MONOCYTES ABSOLUTE: 0.5 X10E3/UL (ref 0.1–0.9)
MONOCYTES RELATIVE PERCENT: 11 %
NEUTROPHILS ABSOLUTE: 2.1 X10E3/UL (ref 1.4–7)
NEUTROPHILS RELATIVE PERCENT: 49 %
PDW BLD-RTO: 13.6 % (ref 11.7–15.4)
PLATELET # BLD: 259 X10E3/UL (ref 150–450)
POTASSIUM SERPL-SCNC: 4.7 MMOL/L (ref 3.5–5.2)
RBC # BLD: 3.92 X10E6/UL (ref 3.77–5.28)
SODIUM BLD-SCNC: 139 MMOL/L (ref 134–144)
TOTAL CK: 229 U/L (ref 32–182)
TOTAL PROTEIN: 6.3 G/DL (ref 6–8.5)
VITAMIN D 25-HYDROXY: 28.5 NG/ML (ref 30–100)
WBC # BLD: 4.2 X10E3/UL (ref 3.4–10.8)